# Patient Record
Sex: MALE | Race: WHITE
[De-identification: names, ages, dates, MRNs, and addresses within clinical notes are randomized per-mention and may not be internally consistent; named-entity substitution may affect disease eponyms.]

---

## 2017-01-31 ENCOUNTER — HOSPITAL ENCOUNTER (INPATIENT)
Dept: HOSPITAL 17 - NEPA | Age: 77
LOS: 4 days | Discharge: HOME | DRG: 988 | End: 2017-02-04
Payer: MEDICARE

## 2017-01-31 VITALS
DIASTOLIC BLOOD PRESSURE: 85 MMHG | RESPIRATION RATE: 16 BRPM | HEART RATE: 156 BPM | OXYGEN SATURATION: 96 % | SYSTOLIC BLOOD PRESSURE: 135 MMHG

## 2017-01-31 VITALS
OXYGEN SATURATION: 96 % | DIASTOLIC BLOOD PRESSURE: 89 MMHG | HEART RATE: 98 BPM | RESPIRATION RATE: 16 BRPM | SYSTOLIC BLOOD PRESSURE: 132 MMHG | TEMPERATURE: 98 F

## 2017-01-31 VITALS
RESPIRATION RATE: 16 BRPM | SYSTOLIC BLOOD PRESSURE: 128 MMHG | OXYGEN SATURATION: 96 % | HEART RATE: 104 BPM | DIASTOLIC BLOOD PRESSURE: 72 MMHG

## 2017-01-31 VITALS
SYSTOLIC BLOOD PRESSURE: 129 MMHG | HEART RATE: 142 BPM | OXYGEN SATURATION: 96 % | DIASTOLIC BLOOD PRESSURE: 56 MMHG | RESPIRATION RATE: 16 BRPM

## 2017-01-31 VITALS
RESPIRATION RATE: 16 BRPM | OXYGEN SATURATION: 96 % | HEART RATE: 120 BPM | DIASTOLIC BLOOD PRESSURE: 70 MMHG | SYSTOLIC BLOOD PRESSURE: 140 MMHG

## 2017-01-31 VITALS
RESPIRATION RATE: 16 BRPM | DIASTOLIC BLOOD PRESSURE: 63 MMHG | OXYGEN SATURATION: 96 % | SYSTOLIC BLOOD PRESSURE: 137 MMHG | HEART RATE: 141 BPM | TEMPERATURE: 98 F

## 2017-01-31 VITALS — BODY MASS INDEX: 25.75 KG/M2 | WEIGHT: 179.9 LBS | HEIGHT: 70 IN

## 2017-01-31 VITALS
SYSTOLIC BLOOD PRESSURE: 110 MMHG | RESPIRATION RATE: 16 BRPM | OXYGEN SATURATION: 96 % | HEART RATE: 108 BPM | DIASTOLIC BLOOD PRESSURE: 68 MMHG

## 2017-01-31 VITALS
DIASTOLIC BLOOD PRESSURE: 74 MMHG | RESPIRATION RATE: 14 BRPM | TEMPERATURE: 97.5 F | HEART RATE: 104 BPM | SYSTOLIC BLOOD PRESSURE: 113 MMHG | OXYGEN SATURATION: 96 %

## 2017-01-31 VITALS
HEART RATE: 126 BPM | DIASTOLIC BLOOD PRESSURE: 57 MMHG | RESPIRATION RATE: 16 BRPM | OXYGEN SATURATION: 96 % | SYSTOLIC BLOOD PRESSURE: 111 MMHG

## 2017-01-31 VITALS — OXYGEN SATURATION: 96 %

## 2017-01-31 DIAGNOSIS — K21.9: ICD-10-CM

## 2017-01-31 DIAGNOSIS — I25.10: ICD-10-CM

## 2017-01-31 DIAGNOSIS — N13.2: ICD-10-CM

## 2017-01-31 DIAGNOSIS — E78.00: ICD-10-CM

## 2017-01-31 DIAGNOSIS — I48.91: Primary | ICD-10-CM

## 2017-01-31 DIAGNOSIS — Z87.442: ICD-10-CM

## 2017-01-31 DIAGNOSIS — I35.0: ICD-10-CM

## 2017-01-31 DIAGNOSIS — Z95.5: ICD-10-CM

## 2017-01-31 DIAGNOSIS — J98.11: ICD-10-CM

## 2017-01-31 DIAGNOSIS — Z85.028: ICD-10-CM

## 2017-01-31 DIAGNOSIS — H91.90: ICD-10-CM

## 2017-01-31 DIAGNOSIS — I10: ICD-10-CM

## 2017-01-31 LAB
ANION GAP SERPL CALC-SCNC: 15 MEQ/L (ref 5–15)
APTT BLD: 33.8 SEC (ref 24.3–30.1)
BASOPHILS # BLD AUTO: 0 TH/MM3 (ref 0–0.2)
BASOPHILS NFR BLD: 0.2 % (ref 0–2)
BUN SERPL-MCNC: 19 MG/DL (ref 7–18)
CHLORIDE SERPL-SCNC: 112 MEQ/L (ref 98–107)
CK SERPL-CCNC: 101 U/L (ref 39–308)
COLOR UR: YELLOW
COMMENT (UR): (no result)
CULTURE IF INDICATED: (no result)
EOSINOPHIL # BLD: 0 TH/MM3 (ref 0–0.4)
EOSINOPHIL NFR BLD: 0.1 % (ref 0–4)
ERYTHROCYTE [DISTWIDTH] IN BLOOD BY AUTOMATED COUNT: 17.4 % (ref 11.6–17.2)
GFR SERPLBLD BASED ON 1.73 SQ M-ARVRAT: 44 ML/MIN (ref 89–?)
GLUCOSE UR STRIP-MCNC: (no result) MG/DL
HCO3 BLD-SCNC: 9.4 MEQ/L (ref 21–32)
HCT VFR BLD CALC: 44.2 % (ref 39–51)
HEMO FLAGS: (no result)
HGB UR QL STRIP: (no result)
INR PPP: 1.3 RATIO
KETONES UR STRIP-MCNC: (no result) MG/DL
LYMPHOCYTES # BLD AUTO: 0.8 TH/MM3 (ref 1–4.8)
LYMPHOCYTES NFR BLD AUTO: 4.6 % (ref 9–44)
MCH RBC QN AUTO: 31.7 PG (ref 27–34)
MCHC RBC AUTO-ENTMCNC: 32.5 % (ref 32–36)
MCV RBC AUTO: 97.5 FL (ref 80–100)
MONOCYTES NFR BLD: 9.6 % (ref 0–8)
NEUTROPHILS # BLD AUTO: 14.7 TH/MM3 (ref 1.8–7.7)
NEUTROPHILS NFR BLD AUTO: 85.5 % (ref 16–70)
NITRITE UR QL STRIP: (no result)
PLATELET # BLD: 204 TH/MM3 (ref 150–450)
POTASSIUM SERPL-SCNC: 4.2 MEQ/L (ref 3.5–5.1)
PROTHROMBIN TIME: 14.3 SEC (ref 9.8–11.6)
RBC # BLD AUTO: 4.53 MIL/MM3 (ref 4.5–5.9)
SODIUM SERPL-SCNC: 136 MEQ/L (ref 136–145)
SP GR UR STRIP: 1.01 (ref 1–1.03)
WBC # BLD AUTO: 17.2 TH/MM3 (ref 4–11)

## 2017-01-31 PROCEDURE — 85730 THROMBOPLASTIN TIME PARTIAL: CPT

## 2017-01-31 PROCEDURE — 96368 THER/DIAG CONCURRENT INF: CPT

## 2017-01-31 PROCEDURE — 93005 ELECTROCARDIOGRAM TRACING: CPT

## 2017-01-31 PROCEDURE — C9113 INJ PANTOPRAZOLE SODIUM, VIA: HCPCS

## 2017-01-31 PROCEDURE — 80053 COMPREHEN METABOLIC PANEL: CPT

## 2017-01-31 PROCEDURE — 83605 ASSAY OF LACTIC ACID: CPT

## 2017-01-31 PROCEDURE — 81001 URINALYSIS AUTO W/SCOPE: CPT

## 2017-01-31 PROCEDURE — 71010: CPT

## 2017-01-31 PROCEDURE — 83880 ASSAY OF NATRIURETIC PEPTIDE: CPT

## 2017-01-31 PROCEDURE — 96375 TX/PRO/DX INJ NEW DRUG ADDON: CPT

## 2017-01-31 PROCEDURE — 94150 VITAL CAPACITY TEST: CPT

## 2017-01-31 PROCEDURE — 85027 COMPLETE CBC AUTOMATED: CPT

## 2017-01-31 PROCEDURE — 74000: CPT

## 2017-01-31 PROCEDURE — 84132 ASSAY OF SERUM POTASSIUM: CPT

## 2017-01-31 PROCEDURE — 82550 ASSAY OF CK (CPK): CPT

## 2017-01-31 PROCEDURE — 85025 COMPLETE CBC W/AUTO DIFF WBC: CPT

## 2017-01-31 PROCEDURE — 96367 TX/PROPH/DG ADDL SEQ IV INF: CPT

## 2017-01-31 PROCEDURE — 80048 BASIC METABOLIC PNL TOTAL CA: CPT

## 2017-01-31 PROCEDURE — 84484 ASSAY OF TROPONIN QUANT: CPT

## 2017-01-31 PROCEDURE — 85610 PROTHROMBIN TIME: CPT

## 2017-01-31 PROCEDURE — 80162 ASSAY OF DIGOXIN TOTAL: CPT

## 2017-01-31 PROCEDURE — 96376 TX/PRO/DX INJ SAME DRUG ADON: CPT

## 2017-01-31 PROCEDURE — 87040 BLOOD CULTURE FOR BACTERIA: CPT

## 2017-01-31 PROCEDURE — 74176 CT ABD & PELVIS W/O CONTRAST: CPT

## 2017-01-31 PROCEDURE — 96365 THER/PROPH/DIAG IV INF INIT: CPT

## 2017-01-31 PROCEDURE — C1769 GUIDE WIRE: HCPCS

## 2017-01-31 RX ADMIN — PANTOPRAZOLE SODIUM SCH MG: 40 INJECTION, POWDER, FOR SOLUTION INTRAVENOUS at 23:24

## 2017-01-31 RX ADMIN — PHENYTOIN SODIUM SCH MLS/HR: 50 INJECTION INTRAMUSCULAR; INTRAVENOUS at 23:24

## 2017-01-31 RX ADMIN — ENOXAPARIN SODIUM SCH MG: 30 INJECTION SUBCUTANEOUS at 23:24

## 2017-01-31 NOTE — RADRPT
EXAM DATE/TIME:  01/31/2017 19:24 

 

[facility] COMPARISON:     

No previous studies available for comparison.

 

                     

INDICATIONS:     

Lower chest pain. 

                     

 

MEDICAL HISTORY:     

Hypertension.          

 

SURGICAL HISTORY:     

None.   

 

ENCOUNTER:     

Initial                                        

 

ACUITY:     

1 day      

 

PAIN SCORE:     

2/10

 

LOCATION:     

Bilateral lower chest 

 

FINDINGS:     

The heart size is normal.  There is some increased density seen at the left base.  The right lung is 
grossly clear.  

 

CONCLUSION:     

 

Mild increased density at the left base likely representing some mild atelectasis or consolidation. 

 

 Randal Foster MD on January 31, 2017 at 20:10                

Board Certified Radiologist.

 This report was verified electronically.

## 2017-01-31 NOTE — PD
Physical Exam


Date Seen by Provider:  Jan 31, 2017


Time Seen by Provider:  19:12


Narrative


76-year-old male presents to the emergency department for evaluation of left 

abdominal pain that started 2 days ago.  Patient reports a history of 

nephrolithiasis as well as stomach cancer.  He states he saw Dr. Kyle 

yesterday for follow-up of the stomach cancer.  He does report a history of 

kidney stones and states that his urologist is Dr. Jarrett.  He states this is 

his typical pain with kidney stones.  He denies any fevers or chills.  No chest 

pain or shortness of breath.  He states he has had some nausea, but no 

vomiting.  He has taken Percocet at home with good relief of the pain.  He 

currently rates the pain 4/10.  Upon arrival in exam room, patient's heart rate 

is noted to be 160-170.





GENERAL: Well-developed well-nourished elderly male patient.


SKIN: Warm and dry.


HEAD: Normocephalic.  


EYES: No scleral icterus. No injection or drainage. 


NECK: Supple, trachea midline. No JVD or lymphadenopathy.


CARDIOVASCULAR: Patient is tachycardic with heart rates 160s to 170s.


RESPIRATORY: Breath sounds equal bilaterally. No accessory muscle use.


GASTROINTESTINAL: Abdomen soft, non-tender, nondistended.  No abdominal pain to 

palpation.


MUSCULOSKELETAL: No cyanosis, or edema. 


BACK: Nontender without obvious deformity.  Mild left CVA tenderness.





Data


Data


Last Documented VS





Vital Signs








  Date Time  Temp Pulse Resp B/P Pulse Ox O2 Delivery O2 Flow Rate FiO2


 


1/31/17 19:29 98.0 141 16 137/63 96 Room Air  








Orders





 Urinalysis - C+S If Indicated (1/31/17 15:50)


B-Type Natriuretic Peptide (1/31/17 15:50)


Basic Metabolic Panel (Bmp) (1/31/17 16:46)


Complete Blood Count With Diff (1/31/17 19:11)


Ct Abd/Pel W/O Iv Contrast (1/31/17 )


Electrocardiogram (1/31/17 )


Creatine Kinase (Cpk) (1/31/17 19:19)


Troponin I (1/31/17 19:19)


Adenosine Inj (Adenocard Inj) (1/31/17 19:22)


Diltiazem Inj (Cardizem Inj) (1/31/17 19:23)


Chest, Single Ap (1/31/17 )


Diltiazem Inj (Cardizem Inj) (1/31/17 19:45)


Diltiazem Inj (Cardizem Inj) (1/31/17 19:32)


Act Partial Throm Time (Ptt) (1/31/17 19:40)


Prothrombin Time / Inr (Pt) (1/31/17 19:40)


Vital Signs (Adult) Q15MX4,Q4H (1/31/17 20:06)


Cardiac Monitor / Telemetry (1/31/17 20:06)


Cardiac Rhythm SATNAM.Q8H (1/31/17 20:06)


^ Notify Dr: Other (1/31/17 20:06)


Diltiazem Inj (Cardizem Inj) (1/31/17 20:15)


Blood Culture (1/31/17 20:42)


Lactic Acid Sepsis Protocol (1/31/17 20:42)


Sodium Chloride 0.9% Flush (Ns Flush) (1/31/17 20:45)


Ceftriaxone Inj (Rocephin Inj) (1/31/17 20:45)


Azithromycin Inj (Zithromax Inj) (1/31/17 20:45)





Labs





 Laboratory Tests








Test 1/31/17 1/31/17 1/31/17





 16:10 19:15 19:40


 


Urine Color YELLOW   


 


Urine Turbidity CLEAR   


 


Urine pH 5.5   


 


Urine Specific Gravity 1.011   


 


Urine Protein TRACE mg/dL  


 


Urine Glucose (UA) NEG mg/dL  


 


Urine Ketones NEG mg/dL  


 


Urine Occult Blood NEG   


 


Urine Nitrite NEG   


 


Urine Bilirubin NEG   


 


Urine Urobilinogen LESS THAN 2.0  





 MG/DL  


 


Urine Leukocyte Esterase NEG   


 


Urine RBC LESS THAN 1  





 /hpf  


 


Urine WBC 1 /hpf  


 


Microscopic Urinalysis Comment CULT NOT  





 INDICATED  


 


Sodium Level 136 MEQ/L  


 


Potassium Level 4.2 MEQ/L  


 


Chloride Level 112 MEQ/L  


 


Carbon Dioxide Level 9.4 MEQ/L  


 


Anion Gap 15 MEQ/L  


 


Blood Urea Nitrogen 19 MG/DL  


 


Creatinine 1.55 MG/DL  


 


Estimat Glomerular Filtration 44 ML/MIN  





Rate   


 


Random Glucose 83 MG/DL  


 


Calcium Level 8.7 MG/DL  


 


B-Type Natriuretic Peptide 106 PG/ML  


 


White Blood Count  17.2 TH/MM3 


 


Red Blood Count  4.53 MIL/MM3 


 


Hemoglobin  14.4 GM/DL 


 


Hematocrit  44.2 % 


 


Mean Corpuscular Volume  97.5 FL 


 


Mean Corpuscular Hemoglobin  31.7 PG 


 


Mean Corpuscular Hemoglobin  32.5 % 





Concent   


 


Red Cell Distribution Width  17.4 % 


 


Platelet Count  204 TH/MM3 


 


Mean Platelet Volume  8.7 FL 


 


Neutrophils (%) (Auto)  85.5 % 


 


Lymphocytes (%) (Auto)  4.6 % 


 


Monocytes (%) (Auto)  9.6 % 


 


Eosinophils (%) (Auto)  0.1 % 


 


Basophils (%) (Auto)  0.2 % 


 


Neutrophils # (Auto)  14.7 TH/MM3 


 


Lymphocytes # (Auto)  0.8 TH/MM3 


 


Monocytes # (Auto)  1.7 TH/MM3 


 


Eosinophils # (Auto)  0.0 TH/MM3 


 


Basophils # (Auto)  0.0 TH/MM3 


 


CBC Comment  DIFF FINAL  


 


Differential Comment    


 


Prothrombin Time   14.3 SEC


 


Prothromb Time International   1.3 RATIO





Ratio   


 


Activated Partial   33.8 SEC





Thromboplast Time   











MDM


Medical Record Reviewed:  Yes


Supervised Visit with JAY:  No


Interpretation(s)


chest x-ray - CONCLUSION:     Mild increased density at the left base likely 

representing some mild atelectasis or consolidation.


Differential Diagnosis


Nephrolithiasis versus diverticulitis versus SVT versus A. fib with RVR versus 

CHF


Narrative Course


76-year-old male presents to the emergency department for evaluation of left 

abdominal pain for 2 days with history of kidney stones stating this is his 

typical pain.  Workup was initiated in triage.  BMP showed elevated BUN and 

creatinine of 19/1.55, which does appear chronic for patient.  UA is 

unremarkable.  Patient was found to be in A. fib with RVR in exam room.  CBC, CK

, troponin, PTT, PT/INR are ordered.  Patient is given 2 boluses of cardizem by 

my attending physician, Dr. Cullen, 20 mg for the first bolus and 28 mg for the 

2nd bolus.





CBC shows leukocytosis of 17.2.  Chest x-ray shows Mild increased density at 

the left base likely representing some mild atelectasis or consolidation.  

Blood cultures x2 are ordered and latic acid is ordered and pending.  Patient 

is given Rocephin 1 gm IV and azithromycin 500 mg IV.





Dr. Cullen will resume care and disposition of patient.


Condition:  Stable








Manuela WhitfieldLAURA Jan 31, 2017 19:16

## 2017-01-31 NOTE — PD
HPI


Chief Complaint:   Complaint


Time Seen by Provider:  15:51


Travel History


International Travel<30 days:  No


Contact w/Intl Traveler<30days:  No


Traveled to known affect area:  No





History of Present Illness


HPI


76-year-old male with history of hypertension and kidney stones, presents to 

emergency department for evaluation, stating that he has a kidney stone.  

States he has passed kidney stones in the past.  He has had left lower 

abdominal pain for 2 days and this is similar to his pain associated with 

kidney stones in the past.  He states that he has not yet seen it pass.  Denies 

any urinary symptoms.  No nausea or vomiting.  No fever or chills.  States that 

he is taking leftover oxycodone for his pain.  Has no other symptoms to report.





PFSH


Past Medical History


Cardiovascular Problems:  Yes


High Cholesterol:  Yes


Diminished Hearing:  Yes


Gastrointestinal Disorders:  Yes


GERD:  Yes


Hypertension:  Yes


Kidney Stones:  Yes





Past Surgical History


Tonsillectomy:  Yes





Social History


Alcohol Use:  No


Tobacco Use:  No


Substance Use:  No





Allergies-Medications


(Allergen,Severity, Reaction):  


Coded Allergies:  


     No Known Allergies (Verified , 11/14/16)


Reported Meds & Prescriptions





Reported Meds & Active Scripts


Active


Reported


Megestrol (Megestrol Acetate) 40 Mg Tab 40 Mg PO BID


Acetazolamide 250 Mg Tab 250 Mg PO TID


Lisinopril 5 Mg Tab 5 Mg PO DAILY


Lovastatin 10 Mg Tab 10 Mg PO DAILY


Lansoprazole 30 Mg Capdr 30 Mg PO DAILY


Aspir-81 (Aspirin) 81 Mg Tabdr   








Review of Systems


Except as stated in HPI:  all other systems reviewed are Neg





Physical Exam


Narrative


GENERAL: Well-nourished male patient, in no acute distress


SKIN: Warm and dry.


HEAD: Atraumatic. Normocephalic. 


EYES: Pupils equal and round. No scleral icterus. No injection or drainage. 


ENT: No nasal bleeding or discharge.  Mucous membranes pink and moist.


NECK: Trachea midline. No JVD. 


CARDIOVASCULAR: Elevated rate and rhythm.  2/6 systolic murmur


RESPIRATORY: No accessory muscle use. Clear to auscultation. Breath sounds 

equal bilaterally. 


GASTROINTESTINAL: Abdomen soft, non-tender, nondistended. Hepatic and splenic 

margins not palpable. 


MUSCULOSKELETAL: No obvious deformities. No clubbing.  No cyanosis.  No edema. 


NEUROLOGICAL: Awake and alert. No obvious cranial nerve deficits.  Motor 

grossly within normal limits. Normal speech.


PSYCHIATRIC: Appropriate mood and affect; insight and judgment normal.





Data


Data


Last Documented VS





Vital Signs








  Date Time  Temp Pulse Resp B/P Pulse Ox O2 Delivery O2 Flow Rate FiO2


 


1/31/17 13:32 97.5 104 14 113/74 96 Room Air  








Orders





 Urinalysis - C+S If Indicated (1/31/17 15:50)


B-Type Natriuretic Peptide (1/31/17 15:50)


Basic Metabolic Panel (Bmp) (1/31/17 16:46)





Labs





 Laboratory Tests








Test 1/31/17





 16:10


 


Urine Color YELLOW 


 


Urine Turbidity CLEAR 


 


Urine pH 5.5 


 


Urine Specific Gravity 1.011 


 


Urine Protein TRACE mg/dL


 


Urine Glucose (UA) NEG mg/dL


 


Urine Ketones NEG mg/dL


 


Urine Occult Blood NEG 


 


Urine Nitrite NEG 


 


Urine Bilirubin NEG 


 


Urine Urobilinogen LESS THAN 2.0





 MG/DL


 


Urine Leukocyte Esterase NEG 


 


Urine RBC LESS THAN 1





 /hpf


 


Urine WBC 1 /hpf


 


Microscopic Urinalysis Comment CULT NOT





 INDICATED


 


Sodium Level 136 MEQ/L


 


Potassium Level 4.2 MEQ/L


 


Chloride Level 112 MEQ/L


 


Carbon Dioxide Level 9.4 MEQ/L


 


Anion Gap 15 MEQ/L


 


Blood Urea Nitrogen 19 MG/DL


 


Creatinine 1.55 MG/DL


 


Estimat Glomerular Filtration 44 ML/MIN





Rate 


 


Random Glucose 83 MG/DL


 


Calcium Level 8.7 MG/DL


 


B-Type Natriuretic Peptide 106 PG/ML











MDM


Medical Decision Making


Medical Screen Exam Complete:  Yes


Emergency Medical Condition:  Yes


Medical Record Reviewed:  Yes


Differential Diagnosis


Renal calculi versus UTI versus muscle strain versus colitis


Narrative Course


76-year-old male presents to emergency department for evaluation.  Patient 

appears without distress.  He states his pain is consistent with previous 

kidney stones.  BMP and urinalysis is ordered in triage.


Condition:  Stable








RiggsElena vera ROSA M Jan 31, 2017 15:51

## 2017-01-31 NOTE — HHI.HP
HPI


Service


[facility]


Primary Care Physician


Trenton Jimenez M.D.


Admission Diagnosis


AFib RVR, left UVJ stone


Chief Complaint:  


2 days left flank pain


Travel History


International Travel<30 Days:  No


Contact w/Intl Traveler <30 Da:  No


Traveled to Known Affected Are:  No


History of Present Illness


77 y/o white male with history hypertension,kidney stones stomach cancer 

followed by oncology,who has passed kidney stones in past.  Patient came to 

hospital with pain similar to his kidney stone pain and on CT does have left 

kidney stone with hydronephrosis but on evaluation in ER was found to be in 

atrial fib with RVR and also had elevated WBC count and on chest xray has mild 

left lower lobe infiltrate.  Patient received 2 doses IV cardiazem which 

initially did reduce heart rate but then heart rate increased again and started 

on cardiazem drip ,also blood pressure has been low and will start IV fluid.  

Patient to be admitted to cardiac unit consult cardiology and urology.





Review of Systems


Other


flank pain





Past Family Social History


Past Medical History


hypertension,kidney stones gerd,stomach cancer years ago ,chronic heart murmur 

Rheumatic fever as child


Past Surgical History


tonsil


Reported Medications


megestrol 40 bid,acetazolamide 250 tid,lisinopril 5 lovastatin 10,lansopazole 

30 asa 81


Allergies:  


Coded Allergies:  


     No Known Allergies (Verified , 11/14/16)


Social History


NS,ND





Physical Exam


Vital Signs





 Vital Signs








  Date Time  Temp Pulse Resp B/P Pulse Ox O2 Delivery O2 Flow Rate FiO2


 


1/31/17 22:00  120 16 140/70 96 Room Air  


 


1/31/17 21:23   20     


 


1/31/17 21:15  126 16 111/57 96 Room Air  


 


1/31/17 21:00  142 16 129/56 96 Room Air  


 


1/31/17 20:45  156 16 135/85 96 Room Air  


 


1/31/17 20:00  104 16 128/72 96 Room Air  


 


1/31/17 19:29 98.0 141 16 137/63 96 Room Air  


 


1/31/17 19:10 98.0 140 16 132/89 96 Room Air  


 


1/31/17 13:32 97.5 104 14 113/74 96 Room Air  








Physical Exam


GENERAL: This is a well-nourished, well-developed patient, in no apparent 

distress.


SKIN: No rashes, ecchymoses or lesions. Cool and dry.


HEAD: Atraumatic. Normocephalic. No temporal or scalp tenderness.


EYES: Pupils equal round and reactive. Extraocular motions intact. No scleral 

icterus. No injection or drainage. 


ENT: Nose without bleeding, purulent drainage or septal hematoma. Throat 

without erythema, tonsillar hypertrophy or exudate. Uvula midline. Airway 

patent.


NECK: Trachea midline. No JVD or lymphadenopathy. Supple, nontender, no 

meningeal signs.


CARDIOVASCULAR: Irregular rate with 2/6 systolic murmur


RESPIRATORY: Clear to auscultation. Breath sounds equal bilaterally. No wheezes

, rales, or rhonchi.  


GASTROINTESTINAL: Abdomen soft, mild-tender left flank nondistended. No hepato-

splenomegaly, or palpable masses. No guarding.


MUSCULOSKELETAL: Extremities without clubbing, cyanosis, or edema. No joint 

tenderness, effusion, or edema noted. No calf tenderness. Negative Homans sign 

bilaterally.


NEUROLOGICAL: Awake and alert. Cranial nerves II through XII intact.  Motor and 

sensory grossly within normal limits. Five out of 5 muscle strength in all 

muscle groups.  Normal speech.


Laboratory





Laboratory Tests








Test 1/31/17 1/31/17 1/31/17 1/31/17





 16:10 19:15 19:20 19:40


 


Urine Color YELLOW    


 


Urine Turbidity CLEAR    


 


Urine pH 5.5    


 


Urine Specific Gravity 1.011    


 


Urine Protein TRACE    


 


Urine Glucose (UA) NEG    


 


Urine Ketones NEG    


 


Urine Occult Blood NEG    


 


Urine Nitrite NEG    


 


Urine Bilirubin NEG    


 


Urine Urobilinogen LESS THAN 2.0    


 


Urine Leukocyte Esterase NEG    


 


Urine RBC LESS THAN 1    


 


Urine WBC 1    


 


Microscopic Urinalysis Comment CULT NOT   





 INDICATED   


 


Sodium Level 136    


 


Potassium Level 4.2    


 


Chloride Level 112    


 


Carbon Dioxide Level 9.4    


 


Anion Gap 15    


 


Blood Urea Nitrogen 19    


 


Creatinine 1.55    


 


Estimat Glomerular Filtration 44    





Rate    


 


Random Glucose 83    


 


Calcium Level 8.7    


 


B-Type Natriuretic Peptide 106    


 


White Blood Count  17.2   


 


Red Blood Count  4.53   


 


Hemoglobin  14.4   


 


Hematocrit  44.2   


 


Mean Corpuscular Volume  97.5   


 


Mean Corpuscular Hemoglobin  31.7   


 


Mean Corpuscular Hemoglobin  32.5   





Concent    


 


Red Cell Distribution Width  17.4   


 


Platelet Count  204   


 


Mean Platelet Volume  8.7   


 


Neutrophils (%) (Auto)  85.5   


 


Lymphocytes (%) (Auto)  4.6   


 


Monocytes (%) (Auto)  9.6   


 


Eosinophils (%) (Auto)  0.1   


 


Basophils (%) (Auto)  0.2   


 


Neutrophils # (Auto)  14.7   


 


Lymphocytes # (Auto)  0.8   


 


Monocytes # (Auto)  1.7   


 


Eosinophils # (Auto)  0.0   


 


Basophils # (Auto)  0.0   


 


CBC Comment  DIFF FINAL   


 


Differential Comment     


 


Total Creatine Kinase   101  


 


Troponin I   LESS THAN 0.02  


 


Prothrombin Time    14.3 


 


Prothromb Time International    1.3 





Ratio    


 


Activated Partial    33.8 





Thromboplast Time    


 


    





Test 1/31/17   





 21:10   


 


Lactic Acid Level 1.6    














 Date/Time Procedure Status





Source Growth 


 


 1/31/17 21:10 Aerobic Blood Culture Received





Blood Peripheral Pending 





 1/31/17 21:10 Anaerobic Blood Culture Received





Blood Peripheral Pending 








Result Diagram:  


1/31/17 1915                                                                   

             1/31/17 1610





Imaging





Last 24 hours Impressions








Chest X-Ray 1/31/17 0000 Signed





Impressions: 





 Service Date/Time:  Tuesday, January 31, 2017 19:24 - CONCLUSION:   Mild 





 increased density at the left base likely representing some mild atelectasis 

or 





 consolidation.    Randal Foster MD 


 


Abdomen/Pelvis CT 1/31/17 0000 Signed





Impressions: 





 Service Date/Time:  Tuesday, January 31, 2017 20:18 - CONCLUSION:  1.  A 6 mm 





 stone at the left UVJ causing moderate to severe dilatation of the left 





 collecting system and left ureter.   2.  At least two small 2-3 mm 





 non-obstructing left renal stones.       Randal Foster MD 








Course


ekg atrial fib with RVR,cardiazem times 2 with drip rocephin zithromax





Assessment and Plan


Problem List:  


(1) Atrial fibrillation with RVR


Status:  Acute


Plan:  to cardiac unit cardiazem drip consult cardiology 





(2) Lung infiltrate


Status:  Acute


Plan:  start rocephin and zithromax WBC elevated recheck am





(3) Calculus of ureter


Status:  Acute


Plan:  pain meds consult urology





(4) Hydronephrosis of left kidney


Status:  Acute


Plan:  consult urology





Assessment and Plan


further plan as case develops


Code Status


full


Discussed Condition With


patient





Physician Certification


2 Midnight Certification Type:  Admission for Inpatient Services


Order for Inpatient Services


The services are ordered in accordance with Medicare regulations or non-

Medicare payer requirements, as applicable.  In the case of services not 

specified as inpatient-only, they are appropriately provided as inpatient 

services in accordance with the 2-midnight benchmark.


Estimated LOS (days):  3


3 days is the estimated time the patient will need to remain in the hospital, 

assuming treatment plan goals are met and no additional complications.


Post-Hospital Plan:  Not yet determined








Yaniv Echeverria MD Jan 31, 2017 23:22

## 2017-01-31 NOTE — RADRPT
EXAM DATE/TIME:  01/31/2017 20:18 

 

[facility] COMPARISON:     

No previous studies available for comparison.

 

 

INDICATIONS:     

LLQ abdominal pain; evaluate for renal stone.

                  

 

ORAL CONTRAST:      

No oral contrast ingested.

                  

 

RADIATION DOSE:     

13.21 CTDIvol (mGy) 

 

 

MEDICAL HISTORY:      

Hypertension. Cardiovascular disease 

 

SURGICAL HISTORY:       

None. 

 

ENCOUNTER:      

Initial

 

ACUITY:      

1 day

 

PAIN SCALE:      

7/10

 

LOCATION:       

Left lower quadrant Abdomen/pelvis

 

TECHNIQUE:     

Volumetric scanning of the abdomen and pelvis was performed.  Using automated exposure control and ad
justment of the mA and/or kV according to patient size, radiation dose was kept as low as reasonably 
achievable to obtain optimal diagnostic quality images. 

 

FINDINGS:     

There is moderate to severe dilatation of the left collection system and left ureter.  There is a 6 m
m stone at the left UVJ.  In addition there are smaller 2-3 mm non-obstructing stones seen in the lef
t collecting system.  Right sided stones are not seen.  There is some cystic change in the central as
pect of the right kidney representing either some mild dilatation in the collecting system versus per
ipelvic cyst.  The right ureter is not dilated.  There is left perinephric stranding.  

 

The liver, spleen, pancreas and adrenal glands are normal for non-contrast CT examination.  There are
 scattered 

atherosclerotic calcifications identified at the arterial system.  No aneurysm is seen.  The pelvic s
tructures appear 

grossly intact.  There is some mild atelectasis seen at the left lung base.  There is some degenerati
ve change in the

lower lumbar spine. 

 

CONCLUSION:     

1.  A 6 mm stone at the left UVJ causing moderate to severe dilatation of the left collecting system 
and left ureter.  

2.  At least two small 2-3 mm non-obstructing left renal stones.  

 

 

 

 Randal Foster MD on January 31, 2017 at 21:21                

Board Certified Radiologist.

 This report was verified electronically.

## 2017-01-31 NOTE — PD
Physical Exam


Narrative


77yo M with PMH of CAD s/p cardiac stent 2008 (Have not seen Dr. Green since 

2008), nephrolithiasis presents to the ED with c/o left abdominal pain for 2 

days.  Denies any fever, n/v, chest pain, sob.  Abdominal is soft, NT/ND.  No 

rebound tenderness or guarding.  As I was evaluating the patient in the room, I 

noticed that his heart rate was in the 150s-170s.  EKG showed afib with RVR at 

163bpm.  Pt denies any history of it.  Pt given cardizem 20mg IV which 

decreased the heart rate to 90s only momentarily and now is back up to 150s.  

Cardizem 28mg IV ordered.  BP is systolic 108.  After second dose of cardizem, 

heart rate came down to 90s but is now back in the 140s.  /72.  Will 

start pt on cardizem drip.  Cardiology consult placed. 





Labs reviewed, leukocytosis at 17.2.  BUN/creatinine elevated at 19/1.55. 

Troponin negative.  Lactic acid normal.   UA negative.  CXR showed mild CT a/p 

showed 6mm left UVJ stone with severe hydronephrosis.  Pt's pain came back and 

subsided with morphine 2mg IV.  Urology consult placed.  Pt has been on 

cardizem drip and heart rate is improved but does occasionally go back up.  

Titrate as needed and admit to [facility] with cardizem drip.  Discussed with [facility] 

hospitalist and accepted.





Data


Data


Last Documented VS





Vital Signs








  Date Time  Temp Pulse Resp B/P Pulse Ox O2 Delivery O2 Flow Rate FiO2


 


1/31/17 22:00  120 16 140/70 96 Room Air  


 


1/31/17 19:29 98.0       








Orders





 Urinalysis - C+S If Indicated (1/31/17 15:50)


B-Type Natriuretic Peptide (1/31/17 15:50)


Basic Metabolic Panel (Bmp) (1/31/17 16:46)


Complete Blood Count With Diff (1/31/17 19:11)


Ct Abd/Pel W/O Iv Contrast (1/31/17 )


Electrocardiogram (1/31/17 )


Creatine Kinase (Cpk) (1/31/17 19:19)


Troponin I (1/31/17 19:19)


Adenosine Inj (Adenocard Inj) (1/31/17 19:22)


Diltiazem Inj (Cardizem Inj) (1/31/17 19:23)


Chest, Single Ap (1/31/17 )


Diltiazem Inj (Cardizem Inj) (1/31/17 19:45)


Diltiazem Inj (Cardizem Inj) (1/31/17 19:32)


Act Partial Throm Time (Ptt) (1/31/17 19:40)


Prothrombin Time / Inr (Pt) (1/31/17 19:40)


Vital Signs (Adult) Q15MX4,Q4H (1/31/17 20:06)


Cardiac Monitor / Telemetry (1/31/17 20:06)


Cardiac Rhythm SATNAM.Q8H (1/31/17 20:06)


^ Notify Dr: Other (1/31/17 20:06)


Diltiazem Inj (Cardizem Inj) (1/31/17 20:15)


Blood Culture (1/31/17 20:42)


Lactic Acid Sepsis Protocol (1/31/17 20:42)


Sodium Chloride 0.9% Flush (Ns Flush) (1/31/17 20:45)


Ceftriaxone Inj (Rocephin Inj) (1/31/17 20:45)


Azithromycin Inj (Zithromax Inj) (1/31/17 20:45)


Morphine Inj (Morphine Inj) (1/31/17 21:00)


Consult Urology (1/31/17 )


Admit Order (Ed Use Only) (1/31/17 22:48)


Consult Cardiology (1/31/17 )





Labs





 Laboratory Tests








Test 1/31/17 1/31/17 1/31/17 1/31/17





 16:10 19:15 19:20 19:40


 


Urine Color YELLOW    


 


Urine Turbidity CLEAR    


 


Urine pH 5.5    


 


Urine Specific Gravity 1.011    


 


Urine Protein TRACE mg/dL   


 


Urine Glucose (UA) NEG mg/dL   


 


Urine Ketones NEG mg/dL   


 


Urine Occult Blood NEG    


 


Urine Nitrite NEG    


 


Urine Bilirubin NEG    


 


Urine Urobilinogen LESS THAN 2.0   





 MG/DL   


 


Urine Leukocyte Esterase NEG    


 


Urine RBC LESS THAN 1   





 /hpf   


 


Urine WBC 1 /hpf   


 


Microscopic Urinalysis Comment CULT NOT   





 INDICATED   


 


Sodium Level 136 MEQ/L   


 


Potassium Level 4.2 MEQ/L   


 


Chloride Level 112 MEQ/L   


 


Carbon Dioxide Level 9.4 MEQ/L   


 


Anion Gap 15 MEQ/L   


 


Blood Urea Nitrogen 19 MG/DL   


 


Creatinine 1.55 MG/DL   


 


Estimat Glomerular Filtration 44 ML/MIN   





Rate    


 


Random Glucose 83 MG/DL   


 


Calcium Level 8.7 MG/DL   


 


B-Type Natriuretic Peptide 106 PG/ML   


 


White Blood Count  17.2 TH/MM3  


 


Red Blood Count  4.53 MIL/MM3  


 


Hemoglobin  14.4 GM/DL  


 


Hematocrit  44.2 %  


 


Mean Corpuscular Volume  97.5 FL  


 


Mean Corpuscular Hemoglobin  31.7 PG  


 


Mean Corpuscular Hemoglobin  32.5 %  





Concent    


 


Red Cell Distribution Width  17.4 %  


 


Platelet Count  204 TH/MM3  


 


Mean Platelet Volume  8.7 FL  


 


Neutrophils (%) (Auto)  85.5 %  


 


Lymphocytes (%) (Auto)  4.6 %  


 


Monocytes (%) (Auto)  9.6 %  


 


Eosinophils (%) (Auto)  0.1 %  


 


Basophils (%) (Auto)  0.2 %  


 


Neutrophils # (Auto)  14.7 TH/MM3  


 


Lymphocytes # (Auto)  0.8 TH/MM3  


 


Monocytes # (Auto)  1.7 TH/MM3  


 


Eosinophils # (Auto)  0.0 TH/MM3  


 


Basophils # (Auto)  0.0 TH/MM3  


 


CBC Comment  DIFF FINAL   


 


Differential Comment     


 


Total Creatine Kinase   101 U/L 


 


Troponin I   LESS THAN 0.02 





   NG/ML 


 


Prothrombin Time    14.3 SEC


 


Prothromb Time International    1.3 RATIO





Ratio    


 


Activated Partial    33.8 SEC





Thromboplast Time    


 


    





Test 1/31/17   





 21:10   


 


Lactic Acid Level 1.6 mmol/L   











[facility]


Supervised Visit with JAY:  Yes


Interpretation(s)


EKG: Afib at 163bpm.  Normal axis.  Narrow QRS.


Critical Care Narrative


Aggregate critical care time was 50 minutes. Time to perform other separately 

billable procedures was not  


included in the critical care time. My time did not include minutes spent 

treating any other patients simultaneously or on  


activities that did not directly contribute to the patient's treatment.  





The services I provided to this patient were to treat and/or prevent clinically 

significant deterioration that could result  


in:  cardiovascular collapse or death.





I provided critical care services requiring my management, as noted below:


Chart data review, documentation time, medication orders and management, vital 

sign assessments/reviewing monitor data,  


ordering and reviewing lab tests, ordering and interpreting/reviewing x-rays 

and diagnostic studies, care of the patient  


and discussion of the patient with the admitting physicians.


Diagnosis





 Primary Impression:  


 Afib


 Qualified Code:  I48.91 - Atrial fibrillation, unspecified type


 Additional Impression:  


 Hydronephrosis of left kidney





Admitting Information


Admitting Physician Requests:  Admit


Condition:  Stable








Raisa Cullen DO Jan 31, 2017 19:41

## 2017-02-01 VITALS
SYSTOLIC BLOOD PRESSURE: 128 MMHG | TEMPERATURE: 97.8 F | OXYGEN SATURATION: 100 % | RESPIRATION RATE: 18 BRPM | HEART RATE: 78 BPM | DIASTOLIC BLOOD PRESSURE: 60 MMHG

## 2017-02-01 VITALS
HEART RATE: 81 BPM | TEMPERATURE: 98 F | OXYGEN SATURATION: 99 % | SYSTOLIC BLOOD PRESSURE: 144 MMHG | DIASTOLIC BLOOD PRESSURE: 66 MMHG | RESPIRATION RATE: 18 BRPM

## 2017-02-01 VITALS
TEMPERATURE: 98 F | SYSTOLIC BLOOD PRESSURE: 147 MMHG | RESPIRATION RATE: 16 BRPM | OXYGEN SATURATION: 98 % | DIASTOLIC BLOOD PRESSURE: 70 MMHG | HEART RATE: 69 BPM

## 2017-02-01 VITALS
RESPIRATION RATE: 18 BRPM | OXYGEN SATURATION: 99 % | DIASTOLIC BLOOD PRESSURE: 60 MMHG | HEART RATE: 82 BPM | SYSTOLIC BLOOD PRESSURE: 125 MMHG | TEMPERATURE: 97.8 F

## 2017-02-01 VITALS
SYSTOLIC BLOOD PRESSURE: 123 MMHG | HEART RATE: 86 BPM | OXYGEN SATURATION: 96 % | DIASTOLIC BLOOD PRESSURE: 64 MMHG | RESPIRATION RATE: 16 BRPM

## 2017-02-01 VITALS
HEART RATE: 72 BPM | SYSTOLIC BLOOD PRESSURE: 131 MMHG | OXYGEN SATURATION: 99 % | DIASTOLIC BLOOD PRESSURE: 66 MMHG | RESPIRATION RATE: 18 BRPM

## 2017-02-01 VITALS — HEART RATE: 96 BPM

## 2017-02-01 VITALS — HEART RATE: 93 BPM

## 2017-02-01 VITALS
RESPIRATION RATE: 18 BRPM | OXYGEN SATURATION: 99 % | TEMPERATURE: 98 F | DIASTOLIC BLOOD PRESSURE: 58 MMHG | SYSTOLIC BLOOD PRESSURE: 116 MMHG | HEART RATE: 70 BPM

## 2017-02-01 VITALS
RESPIRATION RATE: 16 BRPM | OXYGEN SATURATION: 96 % | SYSTOLIC BLOOD PRESSURE: 101 MMHG | DIASTOLIC BLOOD PRESSURE: 61 MMHG | HEART RATE: 108 BPM

## 2017-02-01 VITALS
RESPIRATION RATE: 16 BRPM | SYSTOLIC BLOOD PRESSURE: 110 MMHG | HEART RATE: 74 BPM | OXYGEN SATURATION: 96 % | DIASTOLIC BLOOD PRESSURE: 60 MMHG

## 2017-02-01 VITALS
HEART RATE: 102 BPM | DIASTOLIC BLOOD PRESSURE: 63 MMHG | SYSTOLIC BLOOD PRESSURE: 121 MMHG | RESPIRATION RATE: 16 BRPM | OXYGEN SATURATION: 96 %

## 2017-02-01 VITALS
RESPIRATION RATE: 17 BRPM | HEART RATE: 81 BPM | OXYGEN SATURATION: 99 % | SYSTOLIC BLOOD PRESSURE: 141 MMHG | TEMPERATURE: 98 F | DIASTOLIC BLOOD PRESSURE: 68 MMHG

## 2017-02-01 VITALS
OXYGEN SATURATION: 96 % | DIASTOLIC BLOOD PRESSURE: 63 MMHG | SYSTOLIC BLOOD PRESSURE: 112 MMHG | HEART RATE: 90 BPM | RESPIRATION RATE: 16 BRPM

## 2017-02-01 VITALS
SYSTOLIC BLOOD PRESSURE: 122 MMHG | RESPIRATION RATE: 16 BRPM | HEART RATE: 102 BPM | DIASTOLIC BLOOD PRESSURE: 60 MMHG | OXYGEN SATURATION: 96 %

## 2017-02-01 VITALS
SYSTOLIC BLOOD PRESSURE: 128 MMHG | DIASTOLIC BLOOD PRESSURE: 58 MMHG | RESPIRATION RATE: 16 BRPM | HEART RATE: 86 BPM | OXYGEN SATURATION: 99 %

## 2017-02-01 VITALS — OXYGEN SATURATION: 98 %

## 2017-02-01 VITALS
HEART RATE: 82 BPM | DIASTOLIC BLOOD PRESSURE: 58 MMHG | RESPIRATION RATE: 16 BRPM | OXYGEN SATURATION: 96 % | SYSTOLIC BLOOD PRESSURE: 111 MMHG

## 2017-02-01 VITALS — SYSTOLIC BLOOD PRESSURE: 142 MMHG | DIASTOLIC BLOOD PRESSURE: 77 MMHG

## 2017-02-01 LAB
ACANTHOCYTES BLD QL SMEAR: (no result)
ALP SERPL-CCNC: 64 U/L (ref 45–117)
ALT SERPL-CCNC: 28 U/L (ref 12–78)
ANION GAP SERPL CALC-SCNC: 11 MEQ/L (ref 5–15)
AST SERPL-CCNC: 7 U/L (ref 15–37)
BASOPHILS # BLD AUTO: 0 TH/MM3 (ref 0–0.2)
BASOPHILS NFR BLD: 0 % (ref 0–2)
BILIRUB SERPL-MCNC: 0.6 MG/DL (ref 0.2–1)
BUN SERPL-MCNC: 20 MG/DL (ref 7–18)
CHLORIDE SERPL-SCNC: 117 MEQ/L (ref 98–107)
DACRYOCYTES BLD QL SMEAR: (no result)
EOSINOPHIL # BLD: 0 TH/MM3 (ref 0–0.4)
EOSINOPHIL NFR BLD: 0.2 % (ref 0–4)
ERYTHROCYTE [DISTWIDTH] IN BLOOD BY AUTOMATED COUNT: 16.8 % (ref 11.6–17.2)
GFR SERPLBLD BASED ON 1.73 SQ M-ARVRAT: 45 ML/MIN (ref 89–?)
HCO3 BLD-SCNC: 13 MEQ/L (ref 21–32)
HCT VFR BLD CALC: 37.6 % (ref 39–51)
HEMO FLAGS: (no result)
LYMPHOCYTES # BLD AUTO: 0.6 TH/MM3 (ref 1–4.8)
LYMPHOCYTES NFR BLD AUTO: 4.7 % (ref 9–44)
MCH RBC QN AUTO: 31.8 PG (ref 27–34)
MCHC RBC AUTO-ENTMCNC: 33.7 % (ref 32–36)
MCV RBC AUTO: 94.3 FL (ref 80–100)
MONOCYTES NFR BLD: 10.7 % (ref 0–8)
NEUTROPHILS # BLD AUTO: 11.7 TH/MM3 (ref 1.8–7.7)
NEUTROPHILS NFR BLD AUTO: 84.4 % (ref 16–70)
OVALOCYTES BLD QL SMEAR: (no result)
PLAT MORPH BLD: NORMAL
PLATELET # BLD: 182 TH/MM3 (ref 150–450)
PLATELET BLD QL SMEAR: NORMAL
POTASSIUM SERPL-SCNC: 3.9 MEQ/L (ref 3.5–5.1)
RBC # BLD AUTO: 3.98 MIL/MM3 (ref 4.5–5.9)
SCAN/DIFF: (no result)
SCHISTOCYTES BLD QL SMEAR: (no result)
SODIUM SERPL-SCNC: 141 MEQ/L (ref 136–145)
WBC # BLD AUTO: 13.9 TH/MM3 (ref 4–11)

## 2017-02-01 RX ADMIN — MORPHINE SULFATE PRN MG: 2 INJECTION, SOLUTION INTRAMUSCULAR; INTRAVENOUS at 08:07

## 2017-02-01 RX ADMIN — PHENYTOIN SODIUM SCH MLS/HR: 50 INJECTION INTRAMUSCULAR; INTRAVENOUS at 10:11

## 2017-02-01 RX ADMIN — MEGESTROL ACETATE SCH MG: 40 TABLET ORAL at 08:05

## 2017-02-01 RX ADMIN — TAMSULOSIN HYDROCHLORIDE SCH MG: 0.4 CAPSULE ORAL at 11:18

## 2017-02-01 RX ADMIN — ASPIRIN SCH MG: 81 TABLET ORAL at 08:04

## 2017-02-01 RX ADMIN — PACLITAXEL SCH MG: 6 INJECTION, SOLUTION, CONCENTRATE INTRAVENOUS at 08:04

## 2017-02-01 RX ADMIN — SODIUM CHLORIDE SCH MLS/HR: 900 INJECTION INTRAVENOUS at 08:05

## 2017-02-01 RX ADMIN — ENOXAPARIN SODIUM SCH MG: 30 INJECTION SUBCUTANEOUS at 19:50

## 2017-02-01 RX ADMIN — METOPROLOL SUCCINATE SCH MG: 25 TABLET, EXTENDED RELEASE ORAL at 17:24

## 2017-02-01 RX ADMIN — MEGESTROL ACETATE SCH MG: 40 TABLET ORAL at 20:58

## 2017-02-01 RX ADMIN — SODIUM CHLORIDE, PRESERVATIVE FREE SCH ML: 5 INJECTION INTRAVENOUS at 08:14

## 2017-02-01 RX ADMIN — SODIUM CHLORIDE SCH MLS/HR: 900 INJECTION INTRAVENOUS at 20:58

## 2017-02-01 RX ADMIN — POTASSIUM CHLORIDE SCH MLS/HR: 200 INJECTION, SOLUTION INTRAVENOUS at 22:40

## 2017-02-01 RX ADMIN — SODIUM CHLORIDE, PRESERVATIVE FREE SCH ML: 5 INJECTION INTRAVENOUS at 19:50

## 2017-02-01 RX ADMIN — MORPHINE SULFATE PRN MG: 2 INJECTION, SOLUTION INTRAMUSCULAR; INTRAVENOUS at 11:18

## 2017-02-01 RX ADMIN — PANTOPRAZOLE SODIUM SCH MG: 40 INJECTION, POWDER, FOR SOLUTION INTRAVENOUS at 08:04

## 2017-02-01 RX ADMIN — AZITHROMYCIN SCH MLS/HR: 500 INJECTION, POWDER, LYOPHILIZED, FOR SOLUTION INTRAVENOUS at 19:50

## 2017-02-01 NOTE — PD.CONS
HPI


Service


CV


Consult Requested By


Alyse


Reason for Consult


afib RVR


Primary Care Physician


Trenton Jimenez M.D.


History of Present Illness


This is a 77 yo WM with hx of HTN, aortic stenosis, stomach cancer who came to 

the ED yesterday with acute left lower abdominal pain. He was found to have a 

6mm stone at left UVJ and developed afib. Cardizem drip started and he has 

converted to NSR. Currently he continues to have abdominal pain but denies 

chest pain, SOb or palpitations. (Mindi Novak)





Review of Systems


Consitutional:  DENIES: Fatigue, Fever, Chills


Respiratory:  DENIES: Cough, Snoring, Shortness of breath, Wheezing, Sputum 

production


Cardiovascular:  COMPLAINS OF: See HPI,  DENIES: Chest pain, Palpitations, 

Syncope, Tachycardia


Gastrointestinal:  COMPLAINS OF: Change in bowel habits, Reflux, Bloody stools, 

Melena,  DENIES: Nausea, Vomiting (Mindi Novak)





Past Family Social History


Allergies:  


Coded Allergies:  


     No Known Allergies (Verified , 11/14/16)


Past Medical History


stomach cancer, HTN, moderate aortic stenosis, nephrolithiases


Reported Medications


Megestrol (Megestrol Acetate) 40 Mg Tab 40 Mg PO BID


Acetazolamide 250 Mg Tab 250 Mg PO TID


Lisinopril 5 Mg Tab 5 Mg PO DAILY


Lovastatin 10 Mg Tab 10 Mg PO DAILY


Lansoprazole 30 Mg Capdr 30 Mg PO DAILY


Aspir-81 (Aspirin) 81 Mg Tabdr


Active Ordered Medications





 Current Medications








 Medications


  (Trade)  Dose


 Ordered  Sig/Suzanne


 Route  Start Time


 Stop Time Status Last Admin


 


  (Cardizem Inj/NS


 Inj)  125 ml @ 0


 mls/hr  TITRATE


 IV  1/31/17 20:15


    1/31/17 20:47


 


 


  (Diamox)  250 mg  TID


 PO  2/1/17 09:00


    2/1/17 08:04


 


 


  (Pravachol)  10 mg  DAILY


 PO  2/1/17 09:00


    2/1/17 08:04


 


 


  (Megace)  40 mg  BID


 PO  2/1/17 09:00


    2/1/17 08:05


 


 


 Aspirin 81 mg  81 mg  DAILY


 PO  2/1/17 09:00


    2/1/17 08:04


 


 


 Ceftriaxone


 Sodium 1000 mg/


 Sodium Chloride  100 ml @ 


 200 mls/hr  Q12HR


 IV  2/1/17 09:00


    2/1/17 08:05


 


 


  (Zithromax Inj/


  ml Inj)  250 ml @ 


 250 mls/hr  HS


 IV  2/1/17 21:00


     


 


 


  (Protonix Inj)  40 mg  DAILY


 IV PUSH  1/31/17 23:15


    2/1/17 08:04


 


 


  (NS Flush)  2 ml  UNSCH  PRN


 FLUSH  1/31/17 23:15


     


 


 


  (NS Flush)  2 ml  BID


 FLUSH  2/1/17 09:00


     


 


 


  (Dulcolax Supp)  10 mg  DAILY  PRN


 [place]  1/31/17 23:15


     


 


 


  (Lovenox Inj)  30 mg  HS


 SQ  1/31/17 23:15


    1/31/17 23:24


 


 


  (Narcan Inj)  0.4 mg  UNSCH  PRN


 IV  1/31/17 23:15


     


 


 


 Morphine Sulfate


 2 mg  2 mg  Q4HR  PRN


 IV PUSH  1/31/17 23:15


    2/1/17 08:07


 


 


  (NS 1000 ml Inj)  1,000 ml @ 


 84 mls/hr  [id number]


 IV  1/31/17 23:15


    1/31/17 23:24


 








Family History


non-contributory


Social History


denies etoh or recreational drug use (Mindi Novak)





Physical Exam


Vital Signs





 Vital Signs








  Date Time  Temp Pulse Resp B/P Pulse Ox O2 Delivery O2 Flow Rate FiO2


 


2/1/17 08:13   17     


 


2/1/17 07:58     98   21


 


2/1/17 07:22 98.0 70 18 116/58 99 Room Air  


 


2/1/17 07:20  70 16     


 


2/1/17 06:29  74 16 110/60 96 Room Air  


 


2/1/17 05:00  90 16 112/63 96 Room Air  


 


2/1/17 04:00  82 16 111/58 96 Room Air  


 


2/1/17 03:00  102 16 122/60 96 Room Air  


 


2/1/17 02:00  86 16 123/64 96 Room Air  


 


2/1/17 01:00  108 16 101/61 96 Room Air  


 


2/1/17 00:00  102 16 121/63 96 Room Air  


 


1/31/17 23:26     96   


 


1/31/17 23:00  108 16 110/68 96 Room Air  


 


1/31/17 22:00  120 16 140/70 96 Room Air  


 


1/31/17 21:23   20     


 


1/31/17 21:15  126 16 111/57 96 Room Air  


 


1/31/17 21:00  142 16 129/56 96 Room Air  


 


1/31/17 20:45  156 16 135/85 96 Room Air  


 


1/31/17 20:00  104 16 128/72 96 Room Air  


 


1/31/17 19:29 98.0 141 16 137/63 96 Room Air  


 


1/31/17 19:10 98.0 140 16 132/89 96 Room Air  


 


1/31/17 13:32 97.5 104 14 113/74 96 Room Air  








Physical Exam


GENERAL: 


SKIN: Warm and dry.


EYES: Pupils equal and round. No scleral icterus. No injection or drainage. 


NECK: Trachea midline. No JVD. 


CARDIOVASCULAR: Regular rate and rhythm.  systolic murmur


RESPIRATORY: No accessory muscle use. Clear to auscultation. Breath sounds 

equal bilaterally. 


GASTROINTESTINAL: LLQ tenderness


MUSCULOSKELETAL: Extremities without clubbing, cyanosis, or edema. . 


NEUROLOGICAL: Awake and alert. No obvious cranial nerve deficits.  Motor 

grossly within normal limits.  Normal speech.


PSYCHIATRIC: Appropriate mood and affect; insight and judgment normal.


Laboratory





Laboratory Tests








Test 1/31/17 1/31/17 1/31/17 1/31/17





 16:10 19:15 19:20 19:40


 


Urine Color YELLOW    


 


Urine Turbidity CLEAR    


 


Urine pH 5.5    


 


Urine Specific Gravity 1.011    


 


Urine Protein TRACE    


 


Urine Glucose (UA) NEG    


 


Urine Ketones NEG    


 


Urine Occult Blood NEG    


 


Urine Nitrite NEG    


 


Urine Bilirubin NEG    


 


Urine Urobilinogen LESS THAN 2.0    


 


Urine Leukocyte Esterase NEG    


 


Urine RBC LESS THAN 1    


 


Urine WBC 1    


 


Microscopic Urinalysis Comment CULT NOT   





 INDICATED   


 


Sodium Level 136    


 


Potassium Level 4.2    


 


Chloride Level 112    


 


Carbon Dioxide Level 9.4    


 


Anion Gap 15    


 


Blood Urea Nitrogen 19    


 


Creatinine 1.55    


 


Estimat Glomerular Filtration 44    





Rate    


 


Random Glucose 83    


 


Calcium Level 8.7    


 


B-Type Natriuretic Peptide 106    


 


White Blood Count  17.2   


 


Red Blood Count  4.53   


 


Hemoglobin  14.4   


 


Hematocrit  44.2   


 


Mean Corpuscular Volume  97.5   


 


Mean Corpuscular Hemoglobin  31.7   


 


Mean Corpuscular Hemoglobin  32.5   





Concent    


 


Red Cell Distribution Width  17.4   


 


Platelet Count  204   


 


Mean Platelet Volume  8.7   


 


Neutrophils (%) (Auto)  85.5   


 


Lymphocytes (%) (Auto)  4.6   


 


Monocytes (%) (Auto)  9.6   


 


Eosinophils (%) (Auto)  0.1   


 


Basophils (%) (Auto)  0.2   


 


Neutrophils # (Auto)  14.7   


 


Lymphocytes # (Auto)  0.8   


 


Monocytes # (Auto)  1.7   


 


Eosinophils # (Auto)  0.0   


 


Basophils # (Auto)  0.0   


 


CBC Comment  DIFF FINAL   


 


Differential Comment     


 


Total Creatine Kinase   101  


 


Troponin I   LESS THAN 0.02  


 


Prothrombin Time    14.3 


 


Prothromb Time International    1.3 





Ratio    


 


Activated Partial    33.8 





Thromboplast Time    


 


    





Test 1/31/17 2/1/17  





 21:10 04:45  


 


Lactic Acid Level 1.6    


 


White Blood Count  13.9   


 


Red Blood Count  3.98   


 


Hemoglobin  12.6   


 


Hematocrit  37.6   


 


Mean Corpuscular Volume  94.3   


 


Mean Corpuscular Hemoglobin  31.8   


 


Mean Corpuscular Hemoglobin  33.7   





Concent    


 


Red Cell Distribution Width  16.8   


 


Platelet Count  182   


 


Mean Platelet Volume  8.1   


 


Neutrophils (%) (Auto)  84.4   


 


Lymphocytes (%) (Auto)  4.7   


 


Monocytes (%) (Auto)  10.7   


 


Eosinophils (%) (Auto)  0.2   


 


Basophils (%) (Auto)  0.0   


 


Neutrophils # (Auto)  11.7   


 


Lymphocytes # (Auto)  0.6   


 


Monocytes # (Auto)  1.5   


 


Eosinophils # (Auto)  0.0   


 


Basophils # (Auto)  0.0   


 


CBC Comment  AUTO DIFF   


 


Differential Comment  AUTO DIFF  





  CONFIRMED  


 


Platelet Estimate  NORMAL   


 


Platelet Morphology Comment  NORMAL   


 


Tear Drop Cells  1+   


 


Ovalocytes  1+   


 


Acanthocytes  OCC   


 


Keratocytes  OCC   


 


Sodium Level  141   


 


Potassium Level  3.9   


 


Chloride Level  117   


 


Carbon Dioxide Level  13.0   


 


Anion Gap  11   


 


Blood Urea Nitrogen  20   


 


Creatinine  1.51   


 


Estimat Glomerular Filtration  45   





Rate    


 


Random Glucose  94   


 


Calcium Level  8.5   


 


Total Bilirubin  0.6   


 


Aspartate Amino Transf  7   





(AST/SGOT)    


 


Alanine Aminotransferase  28   





(ALT/SGPT)    


 


Alkaline Phosphatase  64   


 


Total Protein  6.6   


 


Albumin  3.2   














 Date/Time Procedure Status





Source Growth 


 


 1/31/17 21:10 Aerobic Blood Culture Received





Blood Peripheral Pending 





 1/31/17 21:10 Anaerobic Blood Culture Received





Blood Peripheral Pending 





 (Mindi Novak)


Result Diagram:  


2/1/17 0445                                                                    

            2/1/17 0445





Imaging





Last Impressions








Chest X-Ray 1/31/17 0000 Signed





Impressions: 





 Service Date/Time:  Tuesday, January 31, 2017 19:24 - CONCLUSION:   Mild 





 increased density at the left base likely representing some mild atelectasis 

or 





 consolidation.    Randal Foster MD 


 


Abdomen/Pelvis CT 1/31/17 0000 Signed





Impressions: 





 Service Date/Time:  Tuesday, January 31, 2017 20:18 - CONCLUSION:  1.  A 6 mm 





 stone at the left UVJ causing moderate to severe dilatation of the left 





 collecting system and left ureter.   2.  At least two small 2-3 mm 





 non-obstructing left renal stones.       Randal Foster MD 





 (Mindi Novak)





Assessment and Plan


Problem List:  


(1) Afib


Assessment and Plan:  77 yo WM with hx of moderate aortic stenosis, stomach CA 

and HTN developed afib RVR during period of acute abdominal pain due to 6mm 

stone at left UVJ. Cardizem drip started and he has converted to NSR with HR in 

70's. Continues to have abdominal pain "8/10" but denies cardiac sx.


CHADS-VASC= 3, cont ASA 81 mg.


 (Mindi Novak)


Assessment and Plan


------------------------


new onset afib RVR - spontaneous cardioversion.  Likely afib induced by 

increased adrenergic tone related to kidney stone.


patient may need  invasive procedure for stone removal


may consider anticoagulation for CHADS vasc 3.  for now asa 325 until kidney 

stone resolved for risk of bleeding\


will sign off


call with further questions (Murphy John MD)





Problem Qualifiers





(1) Afib:  


Qualified Code:  I48.91 - Atrial fibrillation, unspecified type





Mindi Novak Feb 1, 2017 08:34


Murphy John MD Feb 1, 2017 08:55

## 2017-02-01 NOTE — HHI.PR
Subjective


Remarks


No new complaints.


No chest pain, no palpitations.


Pt is tolerating PO intake.





Objective


Vitals





 Vital Signs








  Date Time  Temp Pulse Resp B/P Pulse Ox O2 Delivery O2 Flow Rate FiO2


 


2/1/17 16:00 98.0 81 17 141/68 99   


 


2/1/17 15:00 98.0 81 18 144/66 99   


 


2/1/17 13:20 97.8 82 18 125/60 99 Room Air  


 


2/1/17 11:23   16     


 


2/1/17 11:20 97.8 78 18 128/60 100 Room Air  


 


2/1/17 09:37  72 18 131/66 99 Room Air  


 


2/1/17 07:58     98   21


 


2/1/17 07:22 98.0 70 18 116/58 99 Room Air  


 


2/1/17 07:20  70 16     


 


2/1/17 06:29  74 16 110/60 96 Room Air  


 


2/1/17 05:00  90 16 112/63 96 Room Air  


 


2/1/17 04:00  82 16 111/58 96 Room Air  


 


2/1/17 03:00  102 16 122/60 96 Room Air  


 


2/1/17 02:00  86 16 123/64 96 Room Air  


 


2/1/17 01:00  108 16 101/61 96 Room Air  


 


2/1/17 00:00  102 16 121/63 96 Room Air  


 


1/31/17 23:26     96   


 


1/31/17 23:00  108 16 110/68 96 Room Air  


 


1/31/17 22:00  120 16 140/70 96 Room Air  


 


1/31/17 21:23   20     


 


1/31/17 21:15  126 16 111/57 96 Room Air  


 


1/31/17 21:00  142 16 129/56 96 Room Air  


 


1/31/17 20:45  156 16 135/85 96 Room Air  


 


1/31/17 20:00  104 16 128/72 96 Room Air  


 


1/31/17 19:29 98.0 141 16 137/63 96 Room Air  


 


1/31/17 19:10 98.0 140 16 132/89 96 Room Air  














 1/31/17 1/31/17 2/1/17





 15:00 23:00 07:00


 


Output Total   400 ml


 


Balance   -400 ml


 


   


 


Output Urine Total   400 ml


 


# Voids   1








Result Diagram:  


2/1/17 0445                                                                    

            2/1/17 0445





Imaging





Last 24 hours Impressions








Chest X-Ray 1/31/17 0000 Signed





Impressions: 





 Service Date/Time:  Tuesday, January 31, 2017 19:24 - CONCLUSION:   Mild 





 increased density at the left base likely representing some mild atelectasis 

or 





 consolidation.    Randal Foster MD 


 


Abdomen/Pelvis CT 1/31/17 0000 Signed





Impressions: 





 Service Date/Time:  Tuesday, January 31, 2017 20:18 - CONCLUSION:  1.  A 6 mm 





 stone at the left UVJ causing moderate to severe dilatation of the left 





 collecting system and left ureter.   2.  At least two small 2-3 mm 





 non-obstructing left renal stones.       Randal Foster MD 








Objective Remarks


GENERAL: This is a well-nourished, well-developed patient, in no apparent 

distress.


CARDIOVASCULAR: regular


RESPIRATORY: Clear to auscultation. Breath sounds equal bilaterally. No wheezes

, rales, or rhonchi.  


GASTROINTESTINAL: Abdomen soft, non-tender, nondistended. Normal active bowel 

sounds


MUSCULOSKELETAL: Extremities without clubbing, cyanosis, or edema.


NEURO:  Alert & Oriented x4 to person, place, time, situation.  Moves all ext x4





A/P


Problem List:  


(1) Atrial fibrillation with RVR


Status:  Acute


Plan:  


- start PO metoprolol XR 12.5mg daily, increase if needed


- wean cardizem to off


- continue to monitor on telemetry


- ASA


- appreciate input from Cardiology


- pt will f/u with Cardiology outpt for possible anticoagulation





(2) Lung infiltrate


Status:  Acute


Plan:  


- suspect atelectasis


- continue rocephin and azithromycin for now


- repeat CXR in AM


- IS








(3) Calculus of ureter


Status:  Acute


Plan:  


- comgmt with Urology


- IVFs


- flomax


- possible cystoscopy if pt fails to pass stone





(4) Hydronephrosis of left kidney


Status:  Acute


Plan:  


- see above





Assessment and Plan





Patient examined.


Assessment and plan formulated with Sonal Jang PA-C.


I agree with the above.








Franco Wing DO Feb 1, 2017 17:16

## 2017-02-01 NOTE — MB
cc:

YUNI SCHOFIELD

****

 

DATE OF CONSULTATION

2/1/2017

 

REASON FOR CONSULTATION

Mr. Cervantes is a pleasant 76-year-old male with a history of nephrolithiasis in

the past.  He presents with a two-to-three day history of left-sided flank pain

and lower abdominal pain.  He has had mild nausea, but no vomiting.  He denies

any fever or chills.  On presentation to the ER last night, he was noted to be

and rapid A. fib and was required to be started on a Cardizem drip due to its

persisting.  He has since converted on his own and denies any chest pain at

present, but he still is complaining of some lower abdominal pain.

 

PAST MEDICAL HISTORY

Includes:

1.  Hypertension

2.  History kidney stones

3.  Stomach cancer

4.  GERD

5.  History of heart murmur with rheumatic fever as a child.

 

PAST SURGICAL HISTORY

Noted for tonsillectomy.

 

MEDICATIONS

For medications, please refer to the chart.

 

ALLERGIES

He has NO KNOWN DRUG ALLERGIES.

 

SOCIAL HISTORY

Negative for smoking and drinking.

 

FAMILY HISTORY

The family history is negative for stone disease.

 

PHYSICAL EXAM

VITALS:  Temperature is 98, heart rate 72, respiratory 18, blood pressure

131/66, 99% on room air.

GENERAL:  He is well-developed, well-nourished 76-year-old male in no acute

distress.

HEENT:  Normocephalic, atraumatic.  Pupils equal round react to light.

Extraocular is intact.

Mouth: oral mucosa moist

NECK:  Supple.

HEART:  Regular rate and rhythm.

RESPIRATORY:  Breath sounds bilaterally.

ABDOMEN:  Soft.  There is left lower quadrant tenderness noted.  No CVA

tenderness noted.

EXTREMITIES:  Show no evidence of cyanosis, clubbing or edema.

 

LABORATORY DATA

White count 13.9 today, 12.6 hemoglobin, 37.6 hematocrit, platelet count is

182.

 

Sodium 141, potassium 3.9, chloride 117, CO2 13, BUN of 20, creatinine 1.5,

glucose of 94, coag is 14.3, INR is 1.3, PTT is 33.8, blood is negative,

nitrite is negative.

 

IMAGING STUDIES

CT scan was reviewed by myself which showed left-sided hydronephrosis with

hydroureter and stone right at the UVJ noted.  A few small stones were also

noted within the left kidney which are approximately 2-3 mm in size.  Right

kidney appeared normal.

The patient also had a chest x-ray which showed mild increased density at the

left base concerning for atelectasis versus consolidation.

 

ASSESSMENT AND PLAN

This is a 76-year-old male with a history of nephrolithiasis with findings of a

5-6 mm left UVJ stone with moderate to severe hydronephrosis and abdominal

pain.  The patient also was in atrial fibrillation which is now resolved and on

a Cardizem drip. At this point, we will observe and see if the stone would pass

on its own.  If it is still not able to pass in the next 24 hours, would

recommend intervention as this may be the cause of the onset of A. Fib.  We

will need to have medical clearance first to proceed.  We will make n.p.o.

after midnight.  We will recommend Flomax 0.4 mg to help with stone passage and

continue IV fluids.

 

 

ADDENDUM

 

REVIEW OF SYSTEMS

A complete review of systems was performed and per the HPI all other systems

are negative.

 

 

 

 

                              _________________________________

                              Yuni SANTOS

D:  2/1/2017/10:35 AM

T:  2/7/2017/2:18 PM

Visit #:  [id number]

Job #:  [id number]

ANDREW

## 2017-02-01 NOTE — EKG
Date Performed: 01/31/2017       Time Performed: 19:21:56

 

PTAGE:      76 years

 

EKG:      ATRIAL FIBRILLATION WITH RAPID VENTRICULAR RESPONSE MARKED ST DEPRESSION, CONSIDER  SUBENDO
CARDIAL INJURY ABNORMAL ECG 

 

NO PREVIOUS TRACING            

 

DOCTOR:   Yordan Rodriguez  Interpretating Date/Time  02/01/2017 15:31:56

## 2017-02-02 VITALS — HEART RATE: 92 BPM

## 2017-02-02 VITALS
OXYGEN SATURATION: 98 % | TEMPERATURE: 97.9 F | SYSTOLIC BLOOD PRESSURE: 152 MMHG | RESPIRATION RATE: 16 BRPM | DIASTOLIC BLOOD PRESSURE: 79 MMHG | HEART RATE: 81 BPM

## 2017-02-02 VITALS — HEART RATE: 127 BPM

## 2017-02-02 VITALS — HEART RATE: 90 BPM

## 2017-02-02 VITALS
TEMPERATURE: 97.9 F | HEART RATE: 93 BPM | DIASTOLIC BLOOD PRESSURE: 77 MMHG | SYSTOLIC BLOOD PRESSURE: 143 MMHG | RESPIRATION RATE: 20 BRPM | OXYGEN SATURATION: 97 %

## 2017-02-02 VITALS
DIASTOLIC BLOOD PRESSURE: 83 MMHG | OXYGEN SATURATION: 97 % | SYSTOLIC BLOOD PRESSURE: 137 MMHG | RESPIRATION RATE: 20 BRPM | TEMPERATURE: 97.8 F | HEART RATE: 158 BPM

## 2017-02-02 VITALS
TEMPERATURE: 98 F | OXYGEN SATURATION: 98 % | HEART RATE: 95 BPM | RESPIRATION RATE: 16 BRPM | SYSTOLIC BLOOD PRESSURE: 131 MMHG | DIASTOLIC BLOOD PRESSURE: 71 MMHG

## 2017-02-02 VITALS — DIASTOLIC BLOOD PRESSURE: 66 MMHG | HEART RATE: 152 BPM | SYSTOLIC BLOOD PRESSURE: 121 MMHG

## 2017-02-02 VITALS
DIASTOLIC BLOOD PRESSURE: 78 MMHG | TEMPERATURE: 98.5 F | SYSTOLIC BLOOD PRESSURE: 147 MMHG | HEART RATE: 88 BPM | OXYGEN SATURATION: 97 % | RESPIRATION RATE: 24 BRPM

## 2017-02-02 VITALS
HEART RATE: 93 BPM | SYSTOLIC BLOOD PRESSURE: 142 MMHG | RESPIRATION RATE: 16 BRPM | TEMPERATURE: 97.4 F | OXYGEN SATURATION: 98 % | DIASTOLIC BLOOD PRESSURE: 78 MMHG

## 2017-02-02 VITALS — SYSTOLIC BLOOD PRESSURE: 126 MMHG | DIASTOLIC BLOOD PRESSURE: 80 MMHG | HEART RATE: 128 BPM

## 2017-02-02 VITALS — HEART RATE: 78 BPM

## 2017-02-02 VITALS — DIASTOLIC BLOOD PRESSURE: 68 MMHG | SYSTOLIC BLOOD PRESSURE: 154 MMHG | HEART RATE: 132 BPM

## 2017-02-02 VITALS — HEART RATE: 94 BPM

## 2017-02-02 VITALS — DIASTOLIC BLOOD PRESSURE: 81 MMHG | SYSTOLIC BLOOD PRESSURE: 155 MMHG | HEART RATE: 141 BPM

## 2017-02-02 VITALS — HEART RATE: 87 BPM

## 2017-02-02 VITALS — SYSTOLIC BLOOD PRESSURE: 127 MMHG | HEART RATE: 145 BPM | DIASTOLIC BLOOD PRESSURE: 89 MMHG

## 2017-02-02 VITALS — HEART RATE: 82 BPM

## 2017-02-02 VITALS — HEART RATE: 80 BPM

## 2017-02-02 VITALS — HEART RATE: 88 BPM

## 2017-02-02 VITALS — HEART RATE: 163 BPM

## 2017-02-02 VITALS — OXYGEN SATURATION: 96 %

## 2017-02-02 VITALS — HEART RATE: 76 BPM

## 2017-02-02 LAB
ANION GAP SERPL CALC-SCNC: 11 MEQ/L (ref 5–15)
BUN SERPL-MCNC: 23 MG/DL (ref 7–18)
CHLORIDE SERPL-SCNC: 116 MEQ/L (ref 98–107)
ERYTHROCYTE [DISTWIDTH] IN BLOOD BY AUTOMATED COUNT: 16.6 % (ref 11.6–17.2)
GFR SERPLBLD BASED ON 1.73 SQ M-ARVRAT: 48 ML/MIN (ref 89–?)
HCO3 BLD-SCNC: 13.2 MEQ/L (ref 21–32)
HCT VFR BLD CALC: 35.2 % (ref 39–51)
MCH RBC QN AUTO: 31.7 PG (ref 27–34)
MCHC RBC AUTO-ENTMCNC: 33.8 % (ref 32–36)
MCV RBC AUTO: 93.6 FL (ref 80–100)
PLATELET # BLD: 172 TH/MM3 (ref 150–450)
POTASSIUM SERPL-SCNC: 3.4 MEQ/L (ref 3.5–5.1)
RBC # BLD AUTO: 3.76 MIL/MM3 (ref 4.5–5.9)
REVIEW FLAG: (no result)
SODIUM SERPL-SCNC: 140 MEQ/L (ref 136–145)
WBC # BLD AUTO: 13 TH/MM3 (ref 4–11)

## 2017-02-02 RX ADMIN — POTASSIUM CHLORIDE SCH MLS/HR: 200 INJECTION, SOLUTION INTRAVENOUS at 10:03

## 2017-02-02 RX ADMIN — SODIUM CHLORIDE SCH MLS/HR: 900 INJECTION INTRAVENOUS at 07:21

## 2017-02-02 RX ADMIN — TAMSULOSIN HYDROCHLORIDE SCH MG: 0.4 CAPSULE ORAL at 07:20

## 2017-02-02 RX ADMIN — MEGESTROL ACETATE SCH MG: 40 TABLET ORAL at 07:22

## 2017-02-02 RX ADMIN — PANTOPRAZOLE SODIUM SCH MG: 40 INJECTION, POWDER, FOR SOLUTION INTRAVENOUS at 07:22

## 2017-02-02 RX ADMIN — ASPIRIN SCH MG: 81 TABLET ORAL at 07:20

## 2017-02-02 RX ADMIN — MORPHINE SULFATE PRN MG: 2 INJECTION, SOLUTION INTRAMUSCULAR; INTRAVENOUS at 04:13

## 2017-02-02 RX ADMIN — AZITHROMYCIN SCH MLS/HR: 500 INJECTION, POWDER, LYOPHILIZED, FOR SOLUTION INTRAVENOUS at 21:25

## 2017-02-02 RX ADMIN — SODIUM CHLORIDE AND POTASSIUM CHLORIDE SCH MLS/HR: 9; 1.49 INJECTION, SOLUTION INTRAVENOUS at 10:08

## 2017-02-02 RX ADMIN — SODIUM CHLORIDE, PRESERVATIVE FREE SCH ML: 5 INJECTION INTRAVENOUS at 21:26

## 2017-02-02 RX ADMIN — PACLITAXEL SCH MG: 6 INJECTION, SOLUTION, CONCENTRATE INTRAVENOUS at 07:20

## 2017-02-02 RX ADMIN — POTASSIUM CHLORIDE SCH MLS/HR: 200 INJECTION, SOLUTION INTRAVENOUS at 11:27

## 2017-02-02 RX ADMIN — AMIODARONE HYDROCHLORIDE SCH MLS/HR: 50 INJECTION, SOLUTION INTRAVENOUS at 22:57

## 2017-02-02 RX ADMIN — SODIUM CHLORIDE AND POTASSIUM CHLORIDE SCH MLS/HR: 9; 1.49 INJECTION, SOLUTION INTRAVENOUS at 21:32

## 2017-02-02 RX ADMIN — METOPROLOL SUCCINATE SCH MG: 25 TABLET, EXTENDED RELEASE ORAL at 07:21

## 2017-02-02 RX ADMIN — POTASSIUM CHLORIDE SCH MLS/HR: 200 INJECTION, SOLUTION INTRAVENOUS at 14:25

## 2017-02-02 RX ADMIN — AMIODARONE HYDROCHLORIDE SCH MLS/HR: 50 INJECTION, SOLUTION INTRAVENOUS at 12:00

## 2017-02-02 RX ADMIN — POTASSIUM CHLORIDE SCH MLS/HR: 200 INJECTION, SOLUTION INTRAVENOUS at 00:45

## 2017-02-02 RX ADMIN — MEGESTROL ACETATE SCH MG: 40 TABLET ORAL at 21:25

## 2017-02-02 RX ADMIN — MORPHINE SULFATE PRN MG: 2 INJECTION, SOLUTION INTRAMUSCULAR; INTRAVENOUS at 14:24

## 2017-02-02 RX ADMIN — SODIUM CHLORIDE SCH MLS/HR: 900 INJECTION INTRAVENOUS at 21:25

## 2017-02-02 RX ADMIN — SODIUM CHLORIDE, PRESERVATIVE FREE SCH ML: 5 INJECTION INTRAVENOUS at 07:22

## 2017-02-02 RX ADMIN — POTASSIUM CHLORIDE SCH MLS/HR: 200 INJECTION, SOLUTION INTRAVENOUS at 13:57

## 2017-02-02 NOTE — HHI.PR
Subjective


Remarks


Pt developed recurrent Afib with RVR.


Pt resumed on IV Cardizem.


Currently HR is controlled.


Pt denies CP, palpitations, or SOB.





Objective


Vitals





 Vital Signs








  Date Time  Temp Pulse Resp B/P Pulse Ox O2 Delivery O2 Flow Rate FiO2


 


2/2/17 10:00  163      


 


2/2/17 07:00  93      


 


2/2/17 07:00 97.9 102 20 143/77 97   


 


2/2/17 05:00  88      


 


2/2/17 04:00  81      


 


2/2/17 04:00 97.9 81 16 152/79 98   


 


2/2/17 03:00  92      


 


2/2/17 02:00  92      


 


2/2/17 01:00  90      


 


2/2/17 00:00  86      


 


2/2/17 00:00 98.0 95 16 131/71 98   


 


2/1/17 23:00  93      


 


2/1/17 22:00  96      


 


2/1/17 21:00  96      


 


2/1/17 20:00  89      


 


2/1/17 20:00 98.0 69 16 147/70 98   


 


2/1/17 19:00  93      


 


2/1/17 18:16  87 17 142/77 99   


 


2/1/17 17:26  86 16 128/58 99 Room Air  


 


2/1/17 16:00 98.0 81 17 141/68 99   


 


2/1/17 15:00 98.0 81 18 144/66 99   


 


2/1/17 13:20 97.8 82 18 125/60 99 Room Air  


 


2/1/17 11:23   16     


 


2/1/17 11:20 97.8 78 18 128/60 100 Room Air  














 2/1/17 2/1/17 2/2/17





 15:00 23:00 07:00


 


Intake Total 200 ml  


 


Output Total 600 ml 800 ml 850 ml


 


Balance -400 ml -800 ml -850 ml


 


   


 


Intake Oral 200 ml  


 


Output Urine Total 600 ml 800 ml 850 ml


 


# Voids 1  


 


# Bowel Movements 0  








Result Diagram:  


2/2/17 0546                                                                    

            2/2/17 0546





Imaging





Last 24 hours Impressions








Chest X-Ray 1/31/17 0000 Signed





Impressions: 





 Service Date/Time:  Tuesday, January 31, 2017 19:24 - CONCLUSION:   Mild 





 increased density at the left base likely representing some mild atelectasis 

or 





 consolidation.    Randal Foster MD 


 


Abdomen/Pelvis CT 1/31/17 0000 Signed





Impressions: 





 Service Date/Time:  Tuesday, January 31, 2017 20:18 - CONCLUSION:  1.  A 6 mm 





 stone at the left UVJ causing moderate to severe dilatation of the left 





 collecting system and left ureter.   2.  At least two small 2-3 mm 





 non-obstructing left renal stones.       Randal Foster MD 








Objective Remarks


GENERAL: This is a well-nourished, well-developed patient, in no apparent 

distress.


CARDIOVASCULAR: irregular


RESPIRATORY: Clear to auscultation. Breath sounds equal bilaterally. No wheezes

, rales, or rhonchi.  


GASTROINTESTINAL: Abdomen soft, non-tender, nondistended. Normal active bowel 

sounds


MUSCULOSKELETAL: Extremities without clubbing, cyanosis, or edema.


NEURO:  Alert & Oriented x4 to person, place, time, situation.  Moves all ext x4





A/P


Problem List:  


(1) Atrial fibrillation with RVR


Status:  Acute


Plan:  


- start PO metoprolol XR 12.5mg daily (2/1/17), stopped 2/2


- pt developed recurrent Afib RVR 2/2, off cardizem gtt


- cardizem gtt resumed


- start PO cardizem 30mg QID, will increase if needed


- wean cardizem to off if possible


- continue to monitor on telemetry


- ASA


- await re-evaluation with Cardiology





- pt will f/u with Cardiology outpt for possible anticoagulation





(2) Calculus of ureter


Status:  Acute


Plan:  


- comgmt with Urology


- IVFs


- flomax


- possible cystoscopy if pt fails to pass stone, 2/3


- Case d/w Dr. Jarrett (2/2/17)





(3) Hydronephrosis of left kidney


Status:  Acute


Plan:  


- see above





(4) Lung infiltrate


Status:  Acute


Plan:  


- suspect atelectasis


- continue rocephin and azithromycin for now


- repeat CXR in AM


- IS








Assessment and Plan





Patient examined.


Assessment and plan formulated with Sonal Jang PA-C.


I agree with the above.








Franco Wing DO Feb 2, 2017 10:39

## 2017-02-02 NOTE — HHI.PR
Subjective


Remarks


Pt seen and examined. In rapid A-fib this AM. K 3.4.





Objective


Vital Signs





 Vital Signs








  Date Time  Temp Pulse Resp B/P Pulse Ox O2 Delivery O2 Flow Rate FiO2


 


2/2/17 10:00  163      


 


2/2/17 07:00  93      


 


2/2/17 07:00 97.9 102 20 143/77 97   


 


2/2/17 05:00  88      


 


2/2/17 04:00  81      


 


2/2/17 04:00 97.9 81 16 152/79 98   


 


2/2/17 03:00  92      


 


2/2/17 02:00  92      


 


2/2/17 01:00  90      


 


2/2/17 00:00  86      


 


2/2/17 00:00 98.0 95 16 131/71 98   


 


2/1/17 23:00  93      


 


2/1/17 22:00  96      


 


2/1/17 21:00  96      


 


2/1/17 20:00  89      


 


2/1/17 20:00 98.0 69 16 147/70 98   


 


2/1/17 19:00  93      


 


2/1/17 18:16  87 17 142/77 99   


 


2/1/17 17:26  86 16 128/58 99 Room Air  


 


2/1/17 16:00 98.0 81 17 141/68 99   


 


2/1/17 15:00 98.0 81 18 144/66 99   


 


2/1/17 13:20 97.8 82 18 125/60 99 Room Air  


 


2/1/17 11:23   16     


 


2/1/17 11:20 97.8 78 18 128/60 100 Room Air  








 I/O








 2/1/17 2/1/17 2/1/17 2/2/17 2/2/17 2/2/17





 07:00 15:00 23:00 07:00 15:00 23:00


 


Intake Total  200 ml    


 


Output Total 400 ml 600 ml 800 ml 850 ml  


 


Balance -400 ml -400 ml -800 ml -850 ml  


 


      


 


Intake Oral  200 ml    


 


Output Urine Total 400 ml 600 ml 800 ml 850 ml  


 


# Voids 1 1    


 


# Bowel Movements  0    








Result Diagram:  


2/2/17 0546                                                                    

            2/2/17 0546





Objective Remarks


Abd:soft, nt,mild LLQ pain


He has not yet passed stone.





Assessment and Plan


Assessment and Plan


76 y.o male with left UVJ stone and hydro


Cancel OR this AM due to rapid A-fib


Replace K+


Cardiology input appreciated.








Wilmer Jarrett DO Feb 2, 2017 10:51

## 2017-02-02 NOTE — PD.CARD.PN
Subjective


Subjective Remarks


back in afib rvr





Objective


Medications





 Active Medications


Amiodarone HCl 150 mg/Dextrose 100 ml @  100 mls/hr ONCE  ONCE IV;  Start 2/2/ 17 at 11:00;  Stop 2/2/17 at 11:59;  Status UNV


Amiodarone HCl/ Dextrose (Cordarone Inj/ D5W ([facility]) Inj) 250 ml @ 0 mls/hr 

CONTINUOUS IV;  Start 2/2/17 at 11:00;  Status UNV


Azithromycin/ Sodium Chloride (Zithromax Inj/  ml Inj) 250 ml @  250 mls/

hr HS IV Last administered on 2/1/17at 19:50; Admin Dose 250 MLS/HR;  Start 2/1/ 17 at 21:00


Diltiazem HCl (Cardizem Inj) 25 mg STK-MED ONCE .ROUTE;  Start 2/2/17 at 08:35;

  Stop 2/2/17 at 08:36;  Status DC


Diltiazem HCl 10 mg 10 mg ONCE  ONCE IV Last administered on 2/2/17at 10:37; 

Admin Dose 10 MG;  Start 2/2/17 at 10:30;  Stop 2/2/17 at 10:31;  Status DC


Diltiazem HCl 30 mg 30 mg QID PO;  Start 2/2/17 at 10:45;  Stop 2/2/17 at 10:53

;  Status DC


Diltiazem HCl/ Sodium Chloride (Cardizem Inj/NS Inj) 125 ml @  10 mls/hr 

TITRATE IV Last administered on 2/2/17at 10:37; Admin Dose 10 MLS/HR;  Start 2/2 /17 at 10:30;  Stop 2/2/17 at 10:53;  Status DC


Fentanyl Citrate (fentaNYL INJ) 250 mcg STK-MED ONCE .ROUTE;  Start 2/2/17 at 08

:35;  Stop 2/2/17 at 08:36;  Status DC


Hydromorphone HCl 0.5 mg 0.5 mg Q4H  PRN IV PUSH;  Start 2/2/17 at 09:00


Lactated Ringer's 1,000 ml @  30 mls/hr Q24H IV;  Start 2/1/17 at 23:30;  Stop 2 /2/17 at 09:54;  Status DC


Metoprolol Succinate 12.5 mg 12.5 mg DAILY PO Last administered on 2/2/17at 07:

21; Admin Dose 12.5 MG;  Start 2/1/17 at 17:15;  Stop 2/2/17 at 10:35;  Status 

DC


Morphine Sulfate 2 mg 2 mg NOW IV Last administered on 2/2/17at 07:02; Admin 

Dose 2 MG;  Start 2/2/17 at 06:18;  Stop 2/2/17 at 07:30;  Status DC


Ondansetron HCl (Zofran Inj) 4 mg STK-MED ONCE .ROUTE;  Start 2/2/17 at 08:35;  

Stop 2/2/17 at 08:36;  Status DC


Potassium Chloride/Sodium Chloride (NS + KCl 20 Meq Inj) 1,000 ml @  84 mls/hr 

[id number] IV Last administered on 2/2/17at 10:08; Admin Dose 84 MLS/HR;  Start 2/2 /17 at 10:00


Potassium Chloride 100 ml @  50 mls/hr Q2H IV Last administered on 2/2/17at 00:

45; Admin Dose 50 MLS/HR;  Start 2/1/17 at 22:00;  Stop 2/2/17 at 01:59;  

Status DC


Potassium Chloride (KCl 20 Meq Premix Inj) 100 ml @  50 mls/hr Q2H IV Last 

administered on 2/2/17at 10:03; Admin Dose 50 MLS/HR;  Start 2/2/17 at 09:00;  

Stop 2/2/17 at 16:59


Sodium Chloride ( ml Inj) 500 ml @  30 mls/hr [id number] IV;  Start 2/1/17 

at 23:30;  Stop 2/2/17 at 09:54;  Status DC


Tamsulosin HCl (Flomax) 0.4 mg DAILY PO Last administered on 2/2/17at 07:20; 

Admin Dose 0.4 MG;  Start 2/1/17 at 11:00


Vital Signs / I&O





 Vital Signs








  Date Time  Temp Pulse Resp B/P Pulse Ox O2 Delivery O2 Flow Rate FiO2


 


2/2/17 10:00  163      


 


2/2/17 07:00  93      


 


2/2/17 07:00 97.9 102 20 143/77 97   


 


2/2/17 05:00  88      


 


2/2/17 04:00  81      


 


2/2/17 04:00 97.9 81 16 152/79 98   


 


2/2/17 03:00  92      


 


2/2/17 02:00  92      


 


2/2/17 01:00  90      


 


2/2/17 00:00  86      


 


2/2/17 00:00 98.0 95 16 131/71 98   


 


2/1/17 23:00  93      


 


2/1/17 22:00  96      


 


2/1/17 21:00  96      


 


2/1/17 20:00  89      


 


2/1/17 20:00 98.0 69 16 147/70 98   


 


2/1/17 19:00  93      


 


2/1/17 18:16  87 17 142/77 99   


 


2/1/17 17:26  86 16 128/58 99 Room Air  


 


2/1/17 16:00 98.0 81 17 141/68 99   


 


2/1/17 15:00 98.0 81 18 144/66 99   


 


2/1/17 13:20 97.8 82 18 125/60 99 Room Air  


 


2/1/17 11:23   16     


 


2/1/17 11:20 97.8 78 18 128/60 100 Room Air  








 I/O








 2/1/17 2/1/17 2/1/17 2/2/17 2/2/17 2/2/17





 07:00 15:00 23:00 07:00 15:00 23:00


 


Intake Total  200 ml    


 


Output Total 400 ml 600 ml 800 ml 850 ml  


 


Balance -400 ml -400 ml -800 ml -850 ml  


 


      


 


Intake Oral  200 ml    


 


Output Urine Total 400 ml 600 ml 800 ml 850 ml  


 


# Voids 1 1    


 


# Bowel Movements  0    








Physical Exam


NECK: Supple, trachea midline. No JVD or lymphadenopathy.


CARDIOVASCULAR: IR IR tachy


RESPIRATORY: Breath sounds equal bilaterally. No accessory muscle use.


GASTROINTESTINAL: Abdomen soft, non-tender, nondistended. 


MUSCULOSKELETAL: No cyanosis, or edema. 


BACK: Nontender without obvious deformity. No CVA tenderness.





Laboratory





Laboratory Tests








Test 2/2/17





 05:46


 


White Blood Count 13.0 TH/MM3


 


Red Blood Count 3.76 MIL/MM3


 


Hemoglobin 11.9 GM/DL


 


Hematocrit 35.2 %


 


Mean Corpuscular Volume 93.6 FL


 


Mean Corpuscular Hemoglobin 31.7 PG


 


Mean Corpuscular Hemoglobin 33.8 %





Concent 


 


Red Cell Distribution Width 16.6 %


 


Platelet Count 172 TH/MM3


 


Mean Platelet Volume 8.1 FL


 


Sodium Level 140 MEQ/L


 


Potassium Level 3.4 MEQ/L


 


Chloride Level 116 MEQ/L


 


Carbon Dioxide Level 13.2 MEQ/L


 


Anion Gap 11 MEQ/L


 


Blood Urea Nitrogen 23 MG/DL


 


Creatinine 1.44 MG/DL


 


Estimat Glomerular Filtration 48 ML/MIN





Rate 


 


Random Glucose 88 MG/DL


 


Calcium Level 8.2 MG/DL








Imaging





Last Impressions








Chest X-Ray 1/31/17 0000 Signed





Impressions: 





 Service Date/Time:  Tuesday, January 31, 2017 19:24 - CONCLUSION:   Mild 





 increased density at the left base likely representing some mild atelectasis 

or 





 consolidation.    Randal Foster MD 


 


Abdomen/Pelvis CT 1/31/17 0000 Signed





Impressions: 





 Service Date/Time:  Tuesday, January 31, 2017 20:18 - CONCLUSION:  1.  A 6 mm 





 stone at the left UVJ causing moderate to severe dilatation of the left 





 collecting system and left ureter.   2.  At least two small 2-3 mm 





 non-obstructing left renal stones.       Randal Foster MD 











Assessment and Plan


Problem List:  


(1) Afib


Assessment and Plan


------------------------


afib rvr - on cardizem gtt.  


start amio gtt in attempt to chemically cardiovert since we know the time of 

onset


once stabilized, consider lithotripsy.





Problem Qualifiers





(1) Afib:  


Qualified Code:  I48.91 - Atrial fibrillation, unspecified type





Murphy John MD Feb 2, 2017 10:59

## 2017-02-02 NOTE — RADRPT
EXAM DATE/TIME:  02/02/2017 10:44 

 

[facility] COMPARISON:     

CHEST SINGLE AP, January 31, 2017, 19:24.

 

                     

INDICATIONS :     

Cough.

                     

 

MEDICAL HISTORY :     

Hypertension.  Cardiovascular disease.        

 

SURGICAL HISTORY :     

None.   

 

ENCOUNTER:     

Initial                                        

 

ACUITY:     

2 days      

 

PAIN SCORE:     

0/10

 

LOCATION:     

Bilateral chest 

 

FINDINGS:     

The cardiac silhouette is enlarged in transverse diameter. There is  subsegmental atelectasis in the 
both bases. The findings have worsened when compared with the prior examination. No pleural effusions
 are identified.

 

CONCLUSION:     

1. Increasing bibasilar atelectasis

 

 

 

 James Monsalve MD on February 02, 2017 at 11:09           

Board Certified Radiologist.

 This report was verified electronically.

## 2017-02-03 VITALS
DIASTOLIC BLOOD PRESSURE: 60 MMHG | HEART RATE: 84 BPM | SYSTOLIC BLOOD PRESSURE: 119 MMHG | OXYGEN SATURATION: 96 % | TEMPERATURE: 98 F | RESPIRATION RATE: 18 BRPM

## 2017-02-03 VITALS
TEMPERATURE: 98.1 F | RESPIRATION RATE: 20 BRPM | OXYGEN SATURATION: 99 % | DIASTOLIC BLOOD PRESSURE: 71 MMHG | HEART RATE: 93 BPM | SYSTOLIC BLOOD PRESSURE: 140 MMHG

## 2017-02-03 VITALS
SYSTOLIC BLOOD PRESSURE: 119 MMHG | OXYGEN SATURATION: 96 % | DIASTOLIC BLOOD PRESSURE: 60 MMHG | HEART RATE: 84 BPM | RESPIRATION RATE: 18 BRPM | TEMPERATURE: 98 F

## 2017-02-03 VITALS — HEART RATE: 80 BPM

## 2017-02-03 VITALS — HEART RATE: 78 BPM

## 2017-02-03 VITALS
SYSTOLIC BLOOD PRESSURE: 144 MMHG | OXYGEN SATURATION: 97 % | DIASTOLIC BLOOD PRESSURE: 77 MMHG | HEART RATE: 84 BPM | RESPIRATION RATE: 24 BRPM | TEMPERATURE: 97.6 F

## 2017-02-03 VITALS — HEART RATE: 76 BPM

## 2017-02-03 VITALS
TEMPERATURE: 97.8 F | OXYGEN SATURATION: 99 % | SYSTOLIC BLOOD PRESSURE: 129 MMHG | RESPIRATION RATE: 18 BRPM | DIASTOLIC BLOOD PRESSURE: 65 MMHG | HEART RATE: 76 BPM

## 2017-02-03 VITALS
SYSTOLIC BLOOD PRESSURE: 130 MMHG | DIASTOLIC BLOOD PRESSURE: 68 MMHG | RESPIRATION RATE: 24 BRPM | TEMPERATURE: 97.7 F | HEART RATE: 88 BPM | OXYGEN SATURATION: 97 %

## 2017-02-03 VITALS
SYSTOLIC BLOOD PRESSURE: 140 MMHG | DIASTOLIC BLOOD PRESSURE: 70 MMHG | RESPIRATION RATE: 18 BRPM | TEMPERATURE: 97.7 F | OXYGEN SATURATION: 98 % | HEART RATE: 74 BPM

## 2017-02-03 VITALS — HEART RATE: 86 BPM

## 2017-02-03 VITALS — HEART RATE: 103 BPM

## 2017-02-03 VITALS — HEART RATE: 79 BPM

## 2017-02-03 VITALS — HEART RATE: 93 BPM

## 2017-02-03 VITALS — HEART RATE: 98 BPM

## 2017-02-03 VITALS — HEART RATE: 74 BPM

## 2017-02-03 VITALS — HEART RATE: 71 BPM

## 2017-02-03 VITALS — HEART RATE: 72 BPM

## 2017-02-03 LAB
ANION GAP SERPL CALC-SCNC: 10 MEQ/L (ref 5–15)
BUN SERPL-MCNC: 23 MG/DL (ref 7–18)
CHLORIDE SERPL-SCNC: 116 MEQ/L (ref 98–107)
DIGOXIN SERPL-MCNC: 1 NG/ML (ref 0.8–2)
ERYTHROCYTE [DISTWIDTH] IN BLOOD BY AUTOMATED COUNT: 17 % (ref 11.6–17.2)
GFR SERPLBLD BASED ON 1.73 SQ M-ARVRAT: 53 ML/MIN (ref 89–?)
HCO3 BLD-SCNC: 13.7 MEQ/L (ref 21–32)
HCT VFR BLD CALC: 34.9 % (ref 39–51)
MCH RBC QN AUTO: 31.8 PG (ref 27–34)
MCHC RBC AUTO-ENTMCNC: 33.6 % (ref 32–36)
MCV RBC AUTO: 94.6 FL (ref 80–100)
PLATELET # BLD: 177 TH/MM3 (ref 150–450)
POTASSIUM SERPL-SCNC: 4.3 MEQ/L (ref 3.5–5.1)
RBC # BLD AUTO: 3.68 MIL/MM3 (ref 4.5–5.9)
REVIEW FLAG: (no result)
SODIUM SERPL-SCNC: 140 MEQ/L (ref 136–145)
WBC # BLD AUTO: 11.8 TH/MM3 (ref 4–11)

## 2017-02-03 PROCEDURE — 0TC78ZZ EXTIRPATION OF MATTER FROM LEFT URETER, VIA NATURAL OR ARTIFICIAL OPENING ENDOSCOPIC: ICD-10-PCS

## 2017-02-03 RX ADMIN — DILTIAZEM HYDROCHLORIDE SCH MG: 120 CAPSULE, EXTENDED RELEASE ORAL at 12:20

## 2017-02-03 RX ADMIN — PACLITAXEL SCH MG: 6 INJECTION, SOLUTION, CONCENTRATE INTRAVENOUS at 08:02

## 2017-02-03 RX ADMIN — MORPHINE SULFATE PRN MG: 2 INJECTION, SOLUTION INTRAMUSCULAR; INTRAVENOUS at 00:45

## 2017-02-03 RX ADMIN — ASPIRIN SCH MG: 81 TABLET ORAL at 08:01

## 2017-02-03 RX ADMIN — SODIUM CHLORIDE, PRESERVATIVE FREE SCH ML: 5 INJECTION INTRAVENOUS at 08:01

## 2017-02-03 RX ADMIN — PANTOPRAZOLE SODIUM SCH MG: 40 INJECTION, POWDER, FOR SOLUTION INTRAVENOUS at 08:01

## 2017-02-03 RX ADMIN — SODIUM CHLORIDE AND POTASSIUM CHLORIDE SCH MLS/HR: 9; 1.49 INJECTION, SOLUTION INTRAVENOUS at 09:03

## 2017-02-03 RX ADMIN — SODIUM CHLORIDE AND POTASSIUM CHLORIDE SCH MLS/HR: 9; 1.49 INJECTION, SOLUTION INTRAVENOUS at 22:08

## 2017-02-03 RX ADMIN — SODIUM CHLORIDE SCH MLS/HR: 900 INJECTION INTRAVENOUS at 07:57

## 2017-02-03 RX ADMIN — MEGESTROL ACETATE SCH MG: 40 TABLET ORAL at 08:02

## 2017-02-03 RX ADMIN — SODIUM CHLORIDE, PRESERVATIVE FREE SCH ML: 5 INJECTION INTRAVENOUS at 21:00

## 2017-02-03 RX ADMIN — TAMSULOSIN HYDROCHLORIDE SCH MG: 0.4 CAPSULE ORAL at 08:01

## 2017-02-03 RX ADMIN — MEGESTROL ACETATE SCH MG: 40 TABLET ORAL at 22:04

## 2017-02-03 NOTE — EKG
Date Performed: 02/03/2017       Time Performed: 05:17:40

 

PTAGE:      76 years

 

EKG:      Sinus rhythm 

 

 Inferior/lateral ST-T changes are nonspecific Borderline ECG

 

PREVIOUS TRACING       : 02/02/2017 16.21 Since previous tracing, no significant change noted

 

DOCTOR:   Lien Do  Interpretating Date/Time  02/03/2017 13:45:21

## 2017-02-03 NOTE — PD.CARD.PN
Subjective


Subjective Remarks


no CV complaints  (Forrest Herman)





Objective


Vital Signs / I&O





 Vital Signs








  Date Time  Temp Pulse Resp B/P Pulse Ox O2 Delivery O2 Flow Rate FiO2


 


2/3/17 07:00 97.7 86 18 140/70 98   


 


2/3/17 07:00  74      


 


2/3/17 06:00  71      


 


2/3/17 05:00  76      


 


2/3/17 04:00  74      


 


2/3/17 03:28 97.7 88 24 130/68 97   


 


2/3/17 03:00  78      


 


2/3/17 02:00  78      


 


2/3/17 01:00  74      


 


2/3/17 00:50   20     


 


2/3/17 00:36 97.6 84 24 144/77 97   


 


2/3/17 00:00  80      


 


2/2/17 23:00  78      


 


2/2/17 22:00  76      


 


2/2/17 21:00  80      


 


2/2/17 20:00  78      


 


2/2/17 19:33 98.5 88 24 147/78 97   


 


2/2/17 19:05        21


 


2/2/17 19:00  87      


 


2/2/17 18:00  94      


 


2/2/17 17:00  82      


 


2/2/17 16:00  90      


 


2/2/17 15:00  83      


 


2/2/17 15:00 97.4 93 16 142/78 98   


 


2/2/17 14:00  127      


 


2/2/17 13:00  132      


 


2/2/17 13:00  132  154/68    


 


2/2/17 12:45  145  127/89    


 


2/2/17 12:30  128  126/80    


 


2/2/17 12:15  141  155/81    


 


2/2/17 12:00  152      


 


2/2/17 12:00  152  121/66    


 


2/2/17 11:10     96   21


 


2/2/17 11:00 97.8 158 20 137/83 97   


 


2/2/17 11:00  154      


 


2/2/17 10:00  163      








 I/O








 2/2/17 2/2/17 2/2/17 2/3/17 2/3/17 2/3/17





 07:00 15:00 23:00 07:00 15:00 23:00


 


Intake Total   1781 ml 2051 ml  


 


Output Total 850 ml  1100 ml 1125 ml  


 


Balance -850 ml  681 ml 926 ml  


 


      


 


Intake Oral   720 ml 480 ml  


 


IV Total   1061 ml 1571 ml  


 


Output Urine Total 850 ml  1100 ml 1125 ml  


 


Stool Total    0 ml  








Physical Exam


GENERAL: Well-nourished, well-developed patient in no apparent distress.


NECK: No JVD. No carotid bruit.


CARDIOVASCULAR: Regular rate and rhythm.  S1/S2 no murmur, rub, or gallop.


RESPIRATORY: No accessory muscle use. Clear to auscultation. Breath sounds 

equal bilaterally. 


GASTROINTESTINAL: Abdomen soft, non-tender, nondistended. 


MUSCULOSKELETAL: Extremities without clubbing, cyanosis, or edema.


Laboratory





Laboratory Tests








Test 2/2/17 2/3/17





 19:03 05:38


 


Potassium Level 4.1 MEQ/L 4.3 MEQ/L


 


White Blood Count  11.8 TH/MM3


 


Red Blood Count  3.68 MIL/MM3


 


Hemoglobin  11.7 GM/DL


 


Hematocrit  34.9 %


 


Mean Corpuscular Volume  94.6 FL


 


Mean Corpuscular Hemoglobin  31.8 PG


 


Mean Corpuscular Hemoglobin  33.6 %





Concent  


 


Red Cell Distribution Width  17.0 %


 


Platelet Count  177 TH/MM3


 


Mean Platelet Volume  7.9 FL


 


Sodium Level  140 MEQ/L


 


Chloride Level  116 MEQ/L


 


Carbon Dioxide Level  13.7 MEQ/L


 


Anion Gap  10 MEQ/L


 


Blood Urea Nitrogen  23 MG/DL


 


Creatinine  1.32 MG/DL


 


Estimat Glomerular Filtration  53 ML/MIN





Rate  


 


Random Glucose  80 MG/DL


 


Calcium Level  8.1 MG/DL


 


Digoxin Level  1.0 NG/ML





 (Forrest Herman)





Assessment and Plan


Problem List:  


(1) Afib


Assessment and Plan


Chemically cardioverted on amiodarone gtt. Will need to convert to PO. Sign off 

call with further questions  (Forrest Herman)


Assessment and Plan


DC amio gtt


cardizem 120 daily


dc dig


NSR


clear for lithotripsy


will sign off


call with further question


asa [phone]  (Murphy John MD)





Problem Qualifiers





(1) Afib:  


Qualified Code:  I48.91 - Atrial fibrillation, unspecified type





Forrest Herman Feb 3, 2017 08:37


Murphy John MD Feb 3, 2017 11:43

## 2017-02-03 NOTE — EKG
Date Performed: 02/02/2017       Time Performed: 13:15:24

 

PTAGE:      76 years

 

EKG:      Atrial fibrillation with rapid ventricular response. Poor R wave progression - probable nor
mal variant Extensive ST-T changes may be due to myocardial ischemia Compared to prior tracing no sig
nificant change there has been improvement in the marked lateral ST segment changes but otherwise no 
significant serial change Abnormal ECG

 

PREVIOUS TRACING       : 01/31/2017 19.21

 

DOCTOR:   Lien Do  Interpretating Date/Time  02/03/2017 13:34:21

## 2017-02-03 NOTE — EKG
Date Performed: 02/02/2017       Time Performed: 16:21:12

 

PTAGE:      76 years

 

EKG:      Sinus arrhythmia. Inferior/lateral T wave changes are nonspecific Compared to previous trac
ing atrial fibrillation has resolved. Prominent ST segment changes laterally have resolved. Borderlin
e ECG

 

PREVIOUS TRACING       : 02/02/2017 13.15

 

DOCTOR:   Lien Do  Interpretating Date/Time  02/03/2017 13:34:59

## 2017-02-03 NOTE — HHI.PR
Subjective


Remarks


Pt chemically cardioverted on amiodarone gtt.


The Amio was stopped and pt started on po Cardizem


Currently with HR in the 80-low 100's on telemetry


Pt had lithotripsy today. 


He states that he has not passed any stones yet. 


He denies any SOB or palpitations. 


Pt reports that he feels weak and hasn't been out of bed much.





Objective


Vitals





 Vital Signs








  Date Time  Temp Pulse Resp B/P Pulse Ox O2 Delivery O2 Flow Rate FiO2


 


2/3/17 14:00  103      


 


2/3/17 13:00  98      


 


2/3/17 12:00  79      


 


2/3/17 11:00  77      


 


2/3/17 11:00 98.0 77 14 137/78 99 Room Air  


 


2/3/17 11:00 97.8 76 18 129/65 99   


 


2/3/17 10:45  75 14 129/65 99   


 


2/3/17 10:33 98.6 95 20 116/57 99 Simple Mask 6 


 


2/3/17 10:30  85 19 121/64 98 Room Air  


 


2/3/17 09:00  80      


 


2/3/17 08:00  80      


 


2/3/17 07:00 97.7 86 18 140/70 98   


 


2/3/17 07:00  74      


 


2/3/17 06:00  71      


 


2/3/17 05:00  76      


 


2/3/17 04:00  74      


 


2/3/17 03:28 97.7 88 24 130/68 97   


 


2/3/17 03:00  78      


 


2/3/17 02:00  78      


 


2/3/17 01:00  74      


 


2/3/17 00:50   20     


 


2/3/17 00:36 97.6 84 24 144/77 97   


 


2/3/17 00:00  80      


 


2/2/17 23:00  78      


 


2/2/17 22:00  76      


 


2/2/17 21:00  80      


 


2/2/17 20:00  78      


 


2/2/17 19:33 98.5 88 24 147/78 97   


 


2/2/17 19:05        21


 


2/2/17 19:00  87      


 


2/2/17 18:00  94      


 


2/2/17 17:00  82      


 


2/2/17 16:00  90      


 


2/2/17 15:00  83      


 


2/2/17 15:00 97.4 93 16 142/78 98   














 2/2/17 2/2/17 2/3/17





 15:00 23:00 07:00


 


Intake Total  1781 ml 2051 ml


 


Output Total  1100 ml 1125 ml


 


Balance  681 ml 926 ml


 


   


 


Intake Oral  720 ml 480 ml


 


IV Total  1061 ml 1571 ml


 


Output Urine Total  1100 ml 1125 ml


 


Stool Total   0 ml








Result Diagram:  


2/3/17 0538                                                                    

            2/3/17 0538





Other Results





 Laboratory Tests








Test 2/2/17 2/2/17 2/3/17





 05:46 19:03 05:38


 


White Blood Count 13.0 TH/MM3  11.8 TH/MM3


 


Red Blood Count 3.76 MIL/MM3  3.68 MIL/MM3


 


Hemoglobin 11.9 GM/DL  11.7 GM/DL


 


Hematocrit 35.2 %  34.9 %


 


Mean Corpuscular Volume 93.6 FL  94.6 FL


 


Mean Corpuscular Hemoglobin 31.7 PG  31.8 PG


 


Mean Corpuscular Hemoglobin 33.8 %  33.6 %





Concent   


 


Red Cell Distribution Width 16.6 %  17.0 %


 


Platelet Count 172 TH/MM3  177 TH/MM3


 


Mean Platelet Volume 8.1 FL  7.9 FL


 


Sodium Level 140 MEQ/L  140 MEQ/L


 


Potassium Level 3.4 MEQ/L 4.1 MEQ/L 4.3 MEQ/L


 


Chloride Level 116 MEQ/L  116 MEQ/L


 


Carbon Dioxide Level 13.2 MEQ/L  13.7 MEQ/L


 


Anion Gap 11 MEQ/L  10 MEQ/L


 


Blood Urea Nitrogen 23 MG/DL  23 MG/DL


 


Creatinine 1.44 MG/DL  1.32 MG/DL


 


Estimat Glomerular Filtration 48 ML/MIN  53 ML/MIN





Rate   


 


Random Glucose 88 MG/DL  80 MG/DL


 


Calcium Level 8.2 MG/DL  8.1 MG/DL


 


Digoxin Level   1.0 NG/ML








Imaging





Last 24 hours Impressions








Chest X-Ray 1/31/17 0000 Signed





Impressions: 





 Service Date/Time:  Tuesday, January 31, 2017 19:24 - CONCLUSION:   Mild 





 increased density at the left base likely representing some mild atelectasis 

or 





 consolidation.    Randal Foster MD 


 


Abdomen/Pelvis CT 1/31/17 0000 Signed





Impressions: 





 Service Date/Time:  Tuesday, January 31, 2017 20:18 - CONCLUSION:  1.  A 6 mm 





 stone at the left UVJ causing moderate to severe dilatation of the left 





 collecting system and left ureter.   2.  At least two small 2-3 mm 





 non-obstructing left renal stones.       Randal Foster MD 








Objective Remarks


General: NAD, AAOx3


Chest: CTA bilaterally


Cardiac: Regular


Abd: +BS, soft ND/NT


Ext: No edema





A/P


Problem List:  


(1) Atrial fibrillation with RVR


Status:  Acute


Plan:  


- Pt admitted with flank pain and hydronephrosis related to kidney stones but 

was also


 found to be in A. fib RVR. 


- Pt was initially started on Cardizem gtt and then converted to Metoprolol XR 

12.5mg daily (2/1/17), stopped 2/2


- Pt developed recurrent Afib RVR on 2/2


- Pt was started on Amiodarone gtt. 


- He chemically cardioverted on amiodarone gtt. and was converted to Cardizem 

120mg po daily on 2/3/17


- Currently in the 80-low 100's on telemetry


- ASA


- Cardiology has cleared the pt for discharge. 


- Pt will f/u with Cardiology outpt for possible anticoagulation





(2) Calculus of ureter


Status:  Acute


Plan:  


- comgmt with Urology


- Pt underwent lithotripsy today with Dr. Jarrett. 


- Flomax


- pt will need to followup with Dr. Jarrett as an otupt. 





(3) Hydronephrosis of left kidney


Status:  Acute


Plan:  


- see above





(4) Lung infiltrate


Status:  Acute


Plan:  


- suspect atelectasis


- Pt has been afebrile. Denies any cough. 


- Stop Rocephin and azithromycin 


- repeat CXR in AM


- IS








Assessment and Plan





Patient examined.


Assessment and plan formulated with Sonal Jang PA-C.


I agree with the above.








Sonal Jang Feb 3, 2017 14:41


Franco Wing DO Feb 8, 2017 18:30

## 2017-02-03 NOTE — RADRPT
EXAM DATE/TIME:  02/03/2017 05:16 

 

[facility] COMPARISON:     

CT ABDOMEN & PELVIS W/O CONTRAST, January 31, 2017, 20:18.

 

                     

INDICATIONS :     

Evaluate for left UVJ stone.

                     

 

MEDICAL HISTORY :     

None.          

 

SURGICAL HISTORY :     

None.   

 

ENCOUNTER:     

Subsequent                                        

 

ACUITY:     

3 days      

 

PAIN SCORE:     

Non-responsive.

 

LOCATION:       

abdomen, all quadrants.

 

FINDINGS:     

Single supine frontal view of the abdomen demonstrates a nonobstructed bowel gas pattern. There is a 
prominent amount of bowel gas and stool throughout the colon. The kidneys are not well-visualized due
 to the bowel gas. The left ureterovesical stone is also not appreciated. The bones demonstrate no ac
[place] finding.

 

CONCLUSION:     

The left ureterovesical stone documented on recent CT is not visualized on this examination likely du
e to overlying bowel gas and stool. Additionally, the kidneys are not well-visualized.

 

 

 

 Randal Enamorado MD on February 03, 2017 at 6:19           

Board Certified Radiologist.

 This report was verified electronically.

## 2017-02-04 VITALS — HEART RATE: 96 BPM

## 2017-02-04 VITALS
OXYGEN SATURATION: 98 % | RESPIRATION RATE: 20 BRPM | HEART RATE: 92 BPM | DIASTOLIC BLOOD PRESSURE: 82 MMHG | SYSTOLIC BLOOD PRESSURE: 148 MMHG

## 2017-02-04 VITALS
DIASTOLIC BLOOD PRESSURE: 71 MMHG | HEART RATE: 76 BPM | SYSTOLIC BLOOD PRESSURE: 135 MMHG | OXYGEN SATURATION: 98 % | RESPIRATION RATE: 18 BRPM

## 2017-02-04 VITALS — HEART RATE: 74 BPM

## 2017-02-04 VITALS
DIASTOLIC BLOOD PRESSURE: 71 MMHG | OXYGEN SATURATION: 97 % | SYSTOLIC BLOOD PRESSURE: 133 MMHG | TEMPERATURE: 97.8 F | RESPIRATION RATE: 18 BRPM | HEART RATE: 84 BPM

## 2017-02-04 VITALS
HEART RATE: 90 BPM | TEMPERATURE: 98.3 F | RESPIRATION RATE: 18 BRPM | OXYGEN SATURATION: 99 % | SYSTOLIC BLOOD PRESSURE: 124 MMHG | DIASTOLIC BLOOD PRESSURE: 80 MMHG

## 2017-02-04 VITALS — HEART RATE: 77 BPM

## 2017-02-04 VITALS — HEART RATE: 76 BPM

## 2017-02-04 VITALS — HEART RATE: 92 BPM

## 2017-02-04 VITALS — HEART RATE: 72 BPM

## 2017-02-04 VITALS — HEART RATE: 94 BPM

## 2017-02-04 VITALS — HEART RATE: 68 BPM

## 2017-02-04 VITALS — HEART RATE: 84 BPM

## 2017-02-04 RX ADMIN — MEGESTROL ACETATE SCH MG: 40 TABLET ORAL at 08:53

## 2017-02-04 RX ADMIN — PACLITAXEL SCH MG: 6 INJECTION, SOLUTION, CONCENTRATE INTRAVENOUS at 08:54

## 2017-02-04 RX ADMIN — DILTIAZEM HYDROCHLORIDE SCH MG: 120 CAPSULE, EXTENDED RELEASE ORAL at 08:53

## 2017-02-04 RX ADMIN — TAMSULOSIN HYDROCHLORIDE SCH MG: 0.4 CAPSULE ORAL at 08:54

## 2017-02-04 RX ADMIN — SODIUM CHLORIDE, PRESERVATIVE FREE SCH ML: 5 INJECTION INTRAVENOUS at 08:55

## 2017-02-04 RX ADMIN — PANTOPRAZOLE SODIUM SCH MG: 40 INJECTION, POWDER, FOR SOLUTION INTRAVENOUS at 08:53

## 2017-02-04 NOTE — HHI.DCPOC
Discharge Care Plan


Diagnosis:  


(1) Calculus of ureter


(2) Hydronephrosis of left kidney


(3) Lung infiltrate


(4) Atrial fibrillation with RVR


Goals to Promote Your Health


* To prevent worsening of your condition and complications


* To maintain your health at the optimal level


Directions to Meet Your Goals


*** Take your medications as prescribed


*** Follow your dietary instruction


*** Follow activity as directed








*** Keep your appointments as scheduled


*** Take your immunizations and boosters as scheduled


*** If your symptoms worsen call your PCP, if no PCP go to Urgent Care Center 

or Emergency Room***


*** Smoking is Dangerous to Your Health. Avoid second hand smoke***


***Call the 24-hour hour crisis hotline for domestic abuse at 1-283.361.2886***








Franco Wing DO Feb 4, 2017 12:52

## 2017-02-04 NOTE — RADRPT
EXAM DATE/TIME:  02/04/2017 05:15 

 

[facility] COMPARISON:     

CHEST SINGLE AP, February 02, 2017, 10:44.

 

                     

INDICATIONS :     

Shortness of breath, possible pulmonary disease.

                     

 

MEDICAL HISTORY :     

Hypertension.  Cardiovascular disease.        

 

SURGICAL HISTORY :     

None.   

 

ENCOUNTER:     

Subsequent                                        

 

ACUITY:     

4 - 6 days      

 

PAIN SCORE:     

0/10

 

LOCATION:     

Bilateral chest 

 

FINDINGS:     

Portable AP view of the chest demonstrates a normal-sized cardiac silhouette. Lungs are underinflated
 with linear opacities at both lung bases. No effusion or pneumothorax is seen.

 

CONCLUSION:     

Underinflated examination with subsegmental atelectasis at the lung bases.

 

 

 

 Randal Enamorado MD on February 04, 2017 at 5:55           

Board Certified Radiologist.

 This report was verified electronically.

## 2017-02-04 NOTE — HHI.DS
Discharge Summary


Admission Date


Jan 31, 2017 at 22:51


Discharge Date:  Feb 4, 2017


Admitting Diagnosis


AFib RVR, left UVJ stone





(1) Atrial fibrillation with RVR


Diagnosis:  Principal





(2) Calculus of ureter


Diagnosis:  Principal





(3) Hydronephrosis of left kidney


Diagnosis:  Principal





(4) Lung infiltrate


Diagnosis:  Principal





Consultants


Dr. Murphy John, Cardiology


Dr. Wilmer Jarrett, Urology


Brief History


77 y/o white male with history hypertension,kidney stones stomach cancer 

followed by oncology,who has passed kidney stones in past.  Patient came to 

hospital with pain similar to his kidney stone pain and on CT does have left 

kidney stone with hydronephrosis but on evaluation in ER was found to be in 

atrial fib with RVR and also had elevated WBC count and on chest xray has mild 

left lower lobe infiltrate.  Patient received 2 doses IV cardiazem which 

initially did reduce heart rate but then heart rate increased again and started 

on cardiazem drip ,also blood pressure has been low and will start IV fluid.  

Patient to be admitted to cardiac unit consult cardiology and urology.


CBC/BMP:  


2/3/17 0538                                                                    

            2/3/17 0538





Significant Findings





Laboratory Tests








Test 2/2/17 2/3/17





 05:46 05:38


 


White Blood Count 13.0 TH/MM3 11.8 TH/MM3





 (4.0-11.0) (4.0-11.0)


 


Red Blood Count 3.76 MIL/MM3 3.68 MIL/MM3





 (4.50-5.90) (4.50-5.90)


 


Hemoglobin 11.9 GM/DL 11.7 GM/DL





 (13.0-17.0) (13.0-17.0)


 


Hematocrit 35.2 % 34.9 %





 (39.0-51.0) (39.0-51.0)


 


Potassium Level 3.4 MEQ/L 





 (3.5-5.1) 


 


Chloride Level 116 MEQ/L 116 MEQ/L





 () ()


 


Carbon Dioxide Level 13.2 MEQ/L 13.7 MEQ/L





 (21.0-32.0) (21.0-32.0)


 


Blood Urea Nitrogen 23 MG/DL (7-18) 23 MG/DL (7-18)


 


Creatinine 1.44 MG/DL 1.32 MG/DL





 (0.60-1.30) (0.60-1.30)


 


Estimat Glomerular Filtration 48 ML/MIN (>89) 53 ML/MIN (>89)





Rate  


 


Calcium Level 8.2 MG/DL 8.1 MG/DL





 (8.5-10.1) (8.5-10.1)








Imaging





Last Impressions








Chest X-Ray 2/4/17 0600 Signed





Impressions: 





 Service Date/Time:  Saturday, February 4, 2017 05:15 - CONCLUSION:  





 Underinflated examination with subsegmental atelectasis at the lung bases.     





 Randal Enamorado MD 


 


Abdomen X-Ray 2/3/17 0600 Signed





Impressions: 





 Service Date/Time:  Friday, February 3, 2017 05:16 - CONCLUSION:  The left 





 ureterovesical stone documented on recent CT is not visualized on this 





 examination likely due to overlying bowel gas and stool. Additionally, the 





 kidneys are not well-visualized.     Randal Enamorado MD 


 


Abdomen/Pelvis CT 1/31/17 0000 Signed





Impressions: 





 Service Date/Time:  Tuesday, January 31, 2017 20:18 - CONCLUSION:  1.  A 6 mm 





 stone at the left UVJ causing moderate to severe dilatation of the left 





 collecting system and left ureter.   2.  At least two small 2-3 mm 





 non-obstructing left renal stones.       Randal Foster MD 








PE at Discharge


General: NAD, AAOx3


Chest: CTA bilaterally


Cardiac: Regular


Abd: +BS, soft ND/NT


Ext: No edema


Hospital Course





(1) Atrial fibrillation with RVR


Status:  Acute


Plan:  


- Pt admitted with flank pain and hydronephrosis related to kidney stones but 

was also


 found to be in A. fib RVR. 


- Pt was initially started on Cardizem gtt and then converted to Metoprolol XR 

12.5mg daily (2/1/17), stopped 2/2


- Pt developed recurrent Afib RVR on 2/2


- Pt was started on Amiodarone gtt. 


- He chemically cardioverted on amiodarone gtt. and was converted to Cardizem 

120mg po daily on 2/3/17


- ASA


- Cardiology has cleared the pt for discharge. 


- Pt will f/u with Cardiology outpt for possible anticoagulation in 3 weeks





(2) Calculus of ureter


Status:  Acute


Plan:  


- comgmt with Urology


- Pt underwent lithotripsy today with Dr. Jarrett. (2/3/17)


- Flomax


- pt will need to followup with Dr. Jarrett as an otupt in 2 weeks





(3) Hydronephrosis of left kidney


Status:  Acute


Plan:  


- see above





(4) Lung infiltrate


Status:  Acute


Plan:  


- suspect atelectasis


- Pt has been afebrile. Denies any cough. 


- Stopped Rocephin and azithromycin 


- CXR (2/4/17) --> no acute findings


- IS


Pt Condition on Discharge:  Stable


Discharge Disposition:  Discharge Home


Discharge Instructions


DIET: Follow Instructions for:  Heart Healthy Diet


Activities you can perform:  Regular-No Restrictions


Follow up Referrals:  


Cardiology - 3 Weeks with Dr. Murphy John


PCP Follow-up - 1 Week with Dr. Trenton Jimenez


Urology - 2 Weeks with Wilmer Jarrett DO





New Medications:  


Diltiazem CD 24 HR (Cardizem CD 24 HR) 120 Mg Caper


120 MG PO DAILY afib #30 Ref 0 CAP


Tamsulosin (Flomax) 0.4 Mg Cap


0.4 MG PO DAILY bph #30 Ref 0 CAP


 ([Aspirin Ec]) 325 MG TABEC


325 MG PO DAILY afib Days 30 Ref 0 TAB.EC


 


Continued Medications:  


Acetazolamide (Acetazolamide) 250 Mg Tab


250 MG PO TID #90 Ref 0 TAB


Lansoprazole (Lansoprazole) 30 Mg Capdr


30 MG PO DAILY Ref 0 CAP


Lovastatin (Lovastatin) 10 Mg Tab


10 MG PO DAILY Cholesterol Management #30 Ref 0 TAB


Megestrol (Megestrol) 40 Mg Tab


40 MG PO BID Ref 0 TAB


Oxycodone-Acetaminophen (Oxycodone-Acetaminophen) 5-325 mg Tab


1 TAB PO Q4H PRN PAIN #12 Ref 0 TAB (This prescription has been renewed)


 


Discontinued Medications:  


Lisinopril (Lisinopril) 5 Mg Tab


5 MG PO DAILY Blood Pressure Management #30 Ref 0 TAB





Additional Information





Patient examined.


Assessment and plan formulated with Sonal Jang PA-C.


I agree with the above.








Franco Wing DO Feb 4, 2017 12:50

## 2017-02-05 NOTE — MP
cc:

YUNI JARRETT

****

 

 

DATE OF SURGERY

2/3/2017

 

PREOPERATIVE DIAGNOSIS

Left hydronephrosis with 6 mm distal left ureteral stone.

 

POSTOPERATIVE DIAGNOSIS

Left hydronephrosis with 6 mm distal left ureteral stone.

 

PROCEDURE

Cystoscopy, stone extraction from the left ureteral orifice.

 

SURGEON

Yuni Jarrett MD

 

ANESTHESIA

Sedation.

 

FLUIDS

200 cc of crystalloid.

 

SPECIMEN

None.

 

COMPLICATIONS

None.

 

DISPOSITION

He tolerated the procedure well, was transferred to recovery in stable

condition.

 

INDICATION

Palmer Cervantes is a 76-year-old male who presented with left-sided flank pain.

CT scan demonstrated a 6 mm distal left ureteral stone.  During admission the

patient was noted to go into rapid A fib requiring initially a Cardizem drip

and then cardiology placed him on amiodarone drip. He was still having

significant flank pain yesterday. A KUB was performed this morning which did

not demonstrate any evidence of any stone.  However, the patient denied passing

any stone fragments.  Decision made to take him to the operating room to

undergo initially flexible cysto due to his current heart condition under

sedation to see if the stone was actually in the bladder to avoid the need for

general anesthesia and ureteroscopy.  Risks and benefits were discussed

preoperatively and he was willing to proceed.

 

The patient was brought to the operating room, identified by myself as Palmer Cervantes. He remained on the stretcher and received sedation.  Preprocedure

antibiotics were administered and he was prepped and draped in the usual

sterile fashion.  2% lidocaine jelly was injected into the penile urethra and

allowed to sit for 5 minutes. While he was monitored and on sedation, the

flexible cystoscope was inserted into the bladder. Pan cystoscopy showed that

the left ureteral orifice the stone was  at this area.  Using an

triceps basket through the flexible cystoscope I was able to remove the stone

from the left ureteral orifice and drop in the bladder.  Attempts were made to

then grab the stone from the bladder but it was fragmenting and I was unable to

pull any stone fragments out of the bladder at this time. The left ureteral

orifice was clear of any stone fragments at this time with good reflux

visualized.  Decision was made to  terminate the procedure, and he was awoken,

transferred to the recovery room in stable addition.  He will then go on to

void out the stone fragments and we can send those off for analysis.  In the

past he has had a history of calcium oxalate and calcium phosphate stones.

 

 

 

                              _________________________________

                              Yuni GARCÍA

D:  2/3/2017/10:40 AM

T:  2017/9:59 PM

Visit #:  [id number]

Job #:  [id number]

## 2017-10-02 ENCOUNTER — HOSPITAL ENCOUNTER (OUTPATIENT)
Dept: HOSPITAL 17 - ESDC | Age: 77
Discharge: HOME | End: 2017-10-02
Attending: INTERNAL MEDICINE
Payer: MEDICARE

## 2017-10-02 DIAGNOSIS — K29.70: ICD-10-CM

## 2017-10-02 DIAGNOSIS — K20.9: ICD-10-CM

## 2017-10-02 DIAGNOSIS — R10.13: Primary | ICD-10-CM

## 2017-10-02 DIAGNOSIS — K21.9: ICD-10-CM

## 2017-10-02 PROCEDURE — 43239 EGD BIOPSY SINGLE/MULTIPLE: CPT

## 2017-10-02 PROCEDURE — 88312 SPECIAL STAINS GROUP 1: CPT

## 2017-10-02 PROCEDURE — 00740: CPT

## 2017-10-02 PROCEDURE — 88305 TISSUE EXAM BY PATHOLOGIST: CPT

## 2017-10-02 NOTE — GIPROC
[facility]

[address], [address]

 

 

EGD PROCEDURE REPORT     EXAM DATE: 10/02/2017

 

PATIENT NAME:      Palmer Cervantes           MR #:      [id number]

YOB: 1940      VISIT #:     [id number]

ATTENDING:     Rabia Reaves MD     ORDER #:     [id number]

ASSISTANT:      Luis Calle RN     STATUS:     outpatient

 

INDICATIONS:  The patient is a 77 yr old male here for an EGD due to epigastric

abdominal pain and history of esophageal reflux

PROCEDURE PERFORMED:     EGD w/ biopsy

MEDICATIONS:     None and Per Anesthesia.

TOPICAL ANESTHETIC:

 

CONSENT: The patient understands the risks and benefits of the procedure and

understands that these risks include, but are not limited to: sedation,

allergic reaction, infection, perforation and/or bleeding. Alternative means of

evaluation and treatment include, among others: physical exam, x-rays, and/or

surgical intervention. The patient elects to proceed with this endoscopic

procedure.

 



medical equipment was checked for proper function. Hand hygiene and appropriate

measures for infection prevention was taken. After the risks, benefits and

alternatives of the procedure were thoroughly explained, Informed consent was

verified, confirmed and timeout was successfully executed by the treatment

team. The patient was anesthetized with topical anesthesia and the EG-[id number]

([id number]) endoscope was introduced through the mouth and advanced to the second

portion of the duodenum.  Retroflexed views revealed no abnormalities  The

gastroscope was then slowly withdrawn and removed.

 

ESOPHAGUS: There was LA Class A esophagitis noted.  A biopsy was performed using

cold forceps.  Sample sent for histology.

 

STOMACH: There was erythematous moderate gastritis in the gastric antrum.  A

biopsy was performed using cold forceps.  Sample sent for histology.   There

was mild gastritis in the gastric body.  A biopsy was performed using cold

forceps.  Sample sent for histology.

 

DUODENUM: The duodenal mucosa appeared normal in the bulb and second portion of

the duodenum.

 

 

 

ADVERSE EVENTS:     There were no complications.

IMPRESSIONS:     1.  There was LA Class A esophagitis noted; biopsy was

performed

2.  There was erythematous gastritis in the gastric antrum; biopsy was performed

 

3.  There was mild gastritis in the gastric body; biopsy was performed

4.  Normal duodenal mucosa in the bulb and second portion of the duodenum

5.  Retroflexed views revealed no abnormalities

 

RECOMMENDATIONS:     1.  Await biopsy results.  Biopsy results will not be ready

for 7-10 days.  If you don't hear from us in two weeks, call our office for

biopsy results.

2.  Anti-reflux regimen

3.  Continue PPI

4.  Avoid NSAIDS

PATIENT CONDITION:     stable

DISPOSITION:     Home

REPEAT EXAM:     Return 1 year EGD pending biopsy results

 

 

___________________________________

Rabia Reaves MD

eSigned:  Rabia Reaves MD 10/02/2017 10:42 AM

 

 

cc: Lucia Ellis [facility] Dorcas Jimenez M.D.

 

 

 

 

PATIENT NAME:  Palmer Cervantes

MR#: [id number]

## 2018-06-04 ENCOUNTER — HOSPITAL ENCOUNTER (EMERGENCY)
Dept: HOSPITAL 17 - NEPD | Age: 78
Discharge: HOME | End: 2018-06-04
Payer: MEDICARE

## 2018-06-04 VITALS
DIASTOLIC BLOOD PRESSURE: 88 MMHG | TEMPERATURE: 97.8 F | OXYGEN SATURATION: 96 % | RESPIRATION RATE: 19 BRPM | HEART RATE: 72 BPM | SYSTOLIC BLOOD PRESSURE: 192 MMHG

## 2018-06-04 VITALS — HEIGHT: 70 IN | BODY MASS INDEX: 25.25 KG/M2 | WEIGHT: 176.37 LBS

## 2018-06-04 DIAGNOSIS — G89.29: ICD-10-CM

## 2018-06-04 DIAGNOSIS — K21.9: ICD-10-CM

## 2018-06-04 DIAGNOSIS — I10: ICD-10-CM

## 2018-06-04 DIAGNOSIS — M54.5: Primary | ICD-10-CM

## 2018-06-04 DIAGNOSIS — I48.91: ICD-10-CM

## 2018-06-04 PROCEDURE — 99283 EMERGENCY DEPT VISIT LOW MDM: CPT

## 2018-06-04 PROCEDURE — 96372 THER/PROPH/DIAG INJ SC/IM: CPT

## 2018-06-04 NOTE — PD
HPI


.


Back pain


Chief Complaint:  Back/ Neck Pain or Injury


Time Seen by Provider:  09:12


Travel History


International Travel<30 days:  No


Contact w/Intl Traveler<30days:  No


Traveled to known affect area:  No





History of Present Illness


HPI


Patient presents with a month long history of low back pain.  He states that it 

all started the day after he "wrestled" with changing a battery in his riding 

lawnmower.  He states that his pain has been persistent since that time and is 

getting progressively worse.  He states that it was acutely worse today causing 

him to call [facility] to be brought to the hospital.  Patient has seen his primary 

care provider.  He has been treated with oral steroids and hydrocodone.  He has 

also had steroid injections in his back.  He states that his doctor mentioned 

that he might need an MRI if his pain persisted.  He is under the impression 

that he could come here today to get his MRI.  He does not have any concerning 

signs or symptoms such as fever, incontinence or saddle anesthesia.  He does 

not even have any radicular symptoms.  His pain is exacerbated by movement.  

Pain is improved with hydrocodone.  Pain is currently rated 6/10.





[facility]


Past Medical History


*** Narrative Medical


The patient and his wife state that he has no significant medical problems.  He 

does admit to hypertension, hyperlipidemia, atrial fibrillation and lymphoma.


Atrial Fibrillation:  Yes


Heart Rhythm Problems:  Yes (A.fib)


Cardiovascular Problems:  Yes


High Cholesterol:  Yes


Diminished Hearing:  Yes


Gastrointestinal Disorders:  Yes


GERD:  Yes


Hypertension:  Yes


Kidney Stones:  Yes


Immunizations Current:  Yes


Pregnant?:  Not Pregnant





Past Surgical History


Tonsillectomy:  Yes





Social History


Alcohol Use:  No


Tobacco Use:  No


Substance Use:  No





Allergies-Medications


(Allergen,Severity, Reaction):  


Coded Allergies:  


     No Known Allergies (Verified , 8/14/17)


Reported Meds & Prescriptions





Reported Meds & Active Scripts


Active


[Aspirin Ec] 325 MG Tabec 325 Mg PO DAILY 30 Days


Oxycodone-Acetaminophen 5-325 mg Tab 1 Tab PO Q4H PRN


Reported


Levothyroxine (Levothyroxine Sodium) 25 Mcg Tab 25 Mcg PO DAILY


Potassium Chloride ER (Potassium Chloride) 10 Meq Tab 10 Meq PO DAILY


Pantoprazole (Pantoprazole Sodium) 40 Mg Tab 40 Mg PO DAILY


Cardizem CD 24 HR (Diltiazem CD 24 HR) 180 Mg Caper 180 Mg PO DAILY


Furosemide 20 Mg Tab 20 Mg PO DAILY


Metoclopramide (Metoclopramide HCl) 10 Mg Tab 10 Mg PO TIDAC


Megestrol (Megestrol Acetate) 40 Mg Tab 40 Mg PO BID


Acetazolamide 250 Mg Tab 250 Mg PO TID








Review of Systems


Except as stated in HPI:  all other systems reviewed are Neg


General / Constitutional:  No: Fever, Chills


Genitourinary:  No: Incontinence


Musculoskeletal:  Positive: Myalgias, Limited ROM





Physical Exam


Narrative


GENERAL: Awake and alert and in no acute distress.


SKIN: Warm and dry.  Normal color and turgor.


HEAD: Normocephalic/atraumatic.


EYES: Pupils are equal.  Extraocular movements are intact.


NECK: Normal range of motion.  Supple.


RESPIRATORY: Nonlabored respirations.  Normal sats.


MUSCULOSKELETAL: Atraumatic.  Normal muscle tone.  His back tenderness is just 

superior to the posterior iliac crest.  There is no midline tenderness or pain.


NEUROLOGICAL: A and O 3.  Nonfocal.


PSYCHIATRIC: Appropriate mood and affect.





Data


Data


Last Documented VS





Vital Signs








  Date Time  Temp Pulse Resp B/P (MAP) Pulse Ox O2 Delivery O2 Flow Rate FiO2


 


6/4/18 08:35 97.8 72 19 192/88 (122) 96   








Orders





 Orders


Lorazepam Inj (Ativan Inj) (6/4/18 09:30)


Morphine Inj (Morphine Inj) (6/4/18 09:30)


Ed Discharge Order (6/4/18 09:26)








[facility]


Medical Decision Making


Medical Screen Exam Complete:  Yes


Emergency Medical Condition:  Yes


Differential Diagnosis


Differential diagnosis includes but is not limited to muscular low back pain, 

DDD, spinal stenosis, epidural abscess, sciatica, kidney infection or stone.


Narrative Course


This patient presents complaining with back pain which has been ongoing for a 

month after doing some lifting and twisting.  His symptoms have gotten 

progressively worse over the course of the last month.  He has seen his primary 

care physician.  He has been treated with oral steroids, oral hydrocodone and 

steroid injections.  His doctor has mentioned that he might need an MRI.  He 

does not have any indications for emergent MRI.  He has not run a fever.  He 

does not have any saddle anesthesia or bowel/bladder incontinence.  He does not 

even have any radicular symptoms.  I will give him a shot of Ativan and 

morphine and then discharge him home with instructions to follow-up with his 

primary care physician to schedule the MRI as an outpatient.





Diagnosis





 Primary Impression:  


 Low back pain


 Qualified Codes:  M54.5 - Low back pain; G89.29 - Other chronic pain


Referrals:  


KINZA FAJARDO M.D.


2 days


Patient Instructions:  General Instructions


Departure Forms:  Tests/Procedures


Disposition:  01 DISCHARGE HOME


Condition:  Stable











Kimberly Glover MD Jun 4, 2018 09:40